# Patient Record
Sex: FEMALE
[De-identification: names, ages, dates, MRNs, and addresses within clinical notes are randomized per-mention and may not be internally consistent; named-entity substitution may affect disease eponyms.]

---

## 2018-10-01 ENCOUNTER — RECORDS - HEALTHEAST (OUTPATIENT)
Dept: ADMINISTRATIVE | Facility: OTHER | Age: 68
End: 2018-10-01

## 2023-06-09 LAB
ALANINE AMINOTRANSFERASE (SGPT) (U/L) IN SER/PLAS: 24 U/L (ref 7–45)
ALBUMIN (G/DL) IN SER/PLAS: 3.9 G/DL (ref 3.4–5)
ALKALINE PHOSPHATASE (U/L) IN SER/PLAS: 80 U/L (ref 33–136)
ANION GAP IN SER/PLAS: 11 MMOL/L (ref 10–20)
ASPARTATE AMINOTRANSFERASE (SGOT) (U/L) IN SER/PLAS: 29 U/L (ref 9–39)
BASOPHILS (10*3/UL) IN BLOOD BY AUTOMATED COUNT: 0.04 X10E9/L (ref 0–0.1)
BASOPHILS/100 LEUKOCYTES IN BLOOD BY AUTOMATED COUNT: 0.7 % (ref 0–2)
BILIRUBIN TOTAL (MG/DL) IN SER/PLAS: 0.7 MG/DL (ref 0–1.2)
C REACTIVE PROTEIN (MG/L) IN SER/PLAS: 0.2 MG/DL
CALCIUM (MG/DL) IN SER/PLAS: 9.7 MG/DL (ref 8.6–10.6)
CARBON DIOXIDE, TOTAL (MMOL/L) IN SER/PLAS: 31 MMOL/L (ref 21–32)
CHLORIDE (MMOL/L) IN SER/PLAS: 107 MMOL/L (ref 98–107)
CREATININE (MG/DL) IN SER/PLAS: 0.68 MG/DL (ref 0.5–1.05)
EOSINOPHILS (10*3/UL) IN BLOOD BY AUTOMATED COUNT: 0.15 X10E9/L (ref 0–0.4)
EOSINOPHILS/100 LEUKOCYTES IN BLOOD BY AUTOMATED COUNT: 2.7 % (ref 0–6)
ERYTHROCYTE DISTRIBUTION WIDTH (RATIO) BY AUTOMATED COUNT: 12.1 % (ref 11.5–14.5)
ERYTHROCYTE MEAN CORPUSCULAR HEMOGLOBIN CONCENTRATION (G/DL) BY AUTOMATED: 32.1 G/DL (ref 32–36)
ERYTHROCYTE MEAN CORPUSCULAR VOLUME (FL) BY AUTOMATED COUNT: 95 FL (ref 80–100)
ERYTHROCYTES (10*6/UL) IN BLOOD BY AUTOMATED COUNT: 4.71 X10E12/L (ref 4–5.2)
GFR FEMALE: >90 ML/MIN/1.73M2
GLUCOSE (MG/DL) IN SER/PLAS: 80 MG/DL (ref 74–99)
HEMATOCRIT (%) IN BLOOD BY AUTOMATED COUNT: 44.6 % (ref 36–46)
HEMOGLOBIN (G/DL) IN BLOOD: 14.3 G/DL (ref 12–16)
IMMATURE GRANULOCYTES/100 LEUKOCYTES IN BLOOD BY AUTOMATED COUNT: 0.4 % (ref 0–0.9)
LEUKOCYTES (10*3/UL) IN BLOOD BY AUTOMATED COUNT: 5.6 X10E9/L (ref 4.4–11.3)
LYMPHOCYTES (10*3/UL) IN BLOOD BY AUTOMATED COUNT: 1.51 X10E9/L (ref 0.8–3)
LYMPHOCYTES/100 LEUKOCYTES IN BLOOD BY AUTOMATED COUNT: 27.2 % (ref 13–44)
MONOCYTES (10*3/UL) IN BLOOD BY AUTOMATED COUNT: 0.44 X10E9/L (ref 0.05–0.8)
MONOCYTES/100 LEUKOCYTES IN BLOOD BY AUTOMATED COUNT: 7.9 % (ref 2–10)
NEUTROPHILS (10*3/UL) IN BLOOD BY AUTOMATED COUNT: 3.4 X10E9/L (ref 1.6–5.5)
NEUTROPHILS/100 LEUKOCYTES IN BLOOD BY AUTOMATED COUNT: 61.1 % (ref 40–80)
NRBC (PER 100 WBCS) BY AUTOMATED COUNT: 0 /100 WBC (ref 0–0)
PLATELETS (10*3/UL) IN BLOOD AUTOMATED COUNT: 257 X10E9/L (ref 150–450)
POTASSIUM (MMOL/L) IN SER/PLAS: 4.4 MMOL/L (ref 3.5–5.3)
PROTEIN TOTAL: 6.6 G/DL (ref 6.4–8.2)
SODIUM (MMOL/L) IN SER/PLAS: 145 MMOL/L (ref 136–145)
THYROTROPIN (MIU/L) IN SER/PLAS BY DETECTION LIMIT <= 0.05 MIU/L: 1.07 MIU/L (ref 0.44–3.98)
UREA NITROGEN (MG/DL) IN SER/PLAS: 24 MG/DL (ref 6–23)

## 2023-06-16 LAB
AB TO VEDOLIZUMAB(ATV) CONC.: <1.6 U/ML
ANSER VDZ:RESULTS: ABNORMAL
VEDOLIZUMAB(VDZ) CONC.: 19.9 UG/ML

## 2023-10-13 PROBLEM — M21.6X9 ACQUIRED PES CAVUS: Status: ACTIVE | Noted: 2023-10-13

## 2023-10-13 PROBLEM — M25.529 ELBOW PAIN: Status: ACTIVE | Noted: 2023-10-13

## 2023-10-13 PROBLEM — N20.0 NEPHROLITHIASIS: Status: ACTIVE | Noted: 2023-10-13

## 2023-10-13 PROBLEM — M79.604 RIGHT LEG PAIN: Status: ACTIVE | Noted: 2023-10-13

## 2023-10-13 PROBLEM — M76.72 PERONEAL TENDONITIS, LEFT: Status: ACTIVE | Noted: 2023-10-13

## 2023-10-13 PROBLEM — R19.5 FECAL OCCULT BLOOD TEST POSITIVE: Status: ACTIVE | Noted: 2023-10-13

## 2023-10-13 PROBLEM — R42 DIZZINESS: Status: ACTIVE | Noted: 2023-10-13

## 2023-10-13 PROBLEM — I25.5 ISCHEMIC CARDIOMYOPATHY: Status: ACTIVE | Noted: 2023-10-13

## 2023-10-13 PROBLEM — D48.5 NEOPLASM OF UNCERTAIN BEHAVIOR OF SKIN: Status: ACTIVE | Noted: 2021-08-25

## 2023-10-13 PROBLEM — K51.30 ULCERATIVE RECTOSIGMOIDITIS WITHOUT COMPLICATION (MULTI): Status: ACTIVE | Noted: 2023-10-13

## 2023-10-13 PROBLEM — M79.675 TOE PAIN, LEFT: Status: ACTIVE | Noted: 2023-10-13

## 2023-10-13 PROBLEM — R35.0 URINARY FREQUENCY: Status: ACTIVE | Noted: 2023-10-13

## 2023-10-13 PROBLEM — J30.0 VASOMOTOR RHINITIS: Status: ACTIVE | Noted: 2023-10-13

## 2023-10-13 PROBLEM — G47.00 INSOMNIA: Status: ACTIVE | Noted: 2023-10-13

## 2023-10-13 PROBLEM — L82.1 OTHER SEBORRHEIC KERATOSIS: Status: ACTIVE | Noted: 2021-08-25

## 2023-10-13 PROBLEM — D23.9 OTHER BENIGN NEOPLASM OF SKIN, UNSPECIFIED: Status: ACTIVE | Noted: 2021-08-25

## 2023-10-13 PROBLEM — J30.1 ALLERGIC REACTION TO INHALED POLLEN: Status: ACTIVE | Noted: 2023-10-13

## 2023-10-13 PROBLEM — H04.129 DRY EYE SYNDROME: Status: ACTIVE | Noted: 2023-10-13

## 2023-10-13 PROBLEM — L81.4 OTHER MELANIN HYPERPIGMENTATION: Status: ACTIVE | Noted: 2021-08-25

## 2023-10-13 PROBLEM — R06.02 SHORTNESS OF BREATH ON EXERTION: Status: ACTIVE | Noted: 2023-10-13

## 2023-10-13 PROBLEM — M75.50 SUBACROMIAL BURSITIS: Status: ACTIVE | Noted: 2023-10-13

## 2023-10-13 PROBLEM — I10 BENIGN ESSENTIAL HYPERTENSION: Status: ACTIVE | Noted: 2023-10-13

## 2023-10-13 PROBLEM — I21.3 STEMI (ST ELEVATION MYOCARDIAL INFARCTION) (MULTI): Status: ACTIVE | Noted: 2023-10-13
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
## 2023-10-13 PROBLEM — A04.8 H. PYLORI INFECTION: Status: ACTIVE | Noted: 2023-10-13

## 2023-10-13 PROBLEM — R03.0 BLOOD PRESSURE ELEVATED WITHOUT HISTORY OF HTN: Status: ACTIVE | Noted: 2023-10-13

## 2023-10-13 PROBLEM — N30.90 CYSTITIS: Status: ACTIVE | Noted: 2023-10-13

## 2023-10-13 PROBLEM — R68.81 EARLY SATIETY: Status: ACTIVE | Noted: 2023-10-13

## 2023-10-13 PROBLEM — E78.49 ESSENTIAL FAMILIAL HYPERLIPIDEMIA: Status: ACTIVE | Noted: 2023-10-13

## 2023-10-13 PROBLEM — I25.10 ASCVD (ARTERIOSCLEROTIC CARDIOVASCULAR DISEASE): Status: ACTIVE | Noted: 2023-10-13

## 2023-10-13 PROBLEM — N20.1 RIGHT URETERAL STONE: Status: ACTIVE | Noted: 2023-10-13

## 2023-10-13 PROBLEM — D22.39 MELANOCYTIC NEVI OF OTHER PARTS OF FACE: Status: ACTIVE | Noted: 2021-08-25

## 2023-10-13 PROBLEM — M25.519 SHOULDER PAIN: Status: ACTIVE | Noted: 2023-10-13

## 2023-10-13 PROBLEM — S69.91XA THUMB INJURY, RIGHT, INITIAL ENCOUNTER: Status: ACTIVE | Noted: 2023-10-13

## 2023-10-13 PROBLEM — R14.0 ABDOMINAL BLOATING: Status: ACTIVE | Noted: 2023-10-13

## 2023-10-13 PROBLEM — R23.2 FLUSHING: Status: ACTIVE | Noted: 2023-10-13

## 2023-10-13 PROBLEM — N13.30 HYDRONEPHROSIS, LEFT: Status: ACTIVE | Noted: 2023-10-13

## 2023-10-13 RX ORDER — ATORVASTATIN CALCIUM 80 MG/1
1 TABLET, FILM COATED ORAL NIGHTLY
COMMUNITY
Start: 2021-10-28 | End: 2023-12-14 | Stop reason: SDUPTHER

## 2023-10-13 RX ORDER — ACETAMINOPHEN 500 MG
TABLET ORAL
COMMUNITY

## 2023-10-13 RX ORDER — ASPIRIN 81 MG/1
1 TABLET ORAL DAILY
COMMUNITY
Start: 2021-10-28

## 2023-10-13 RX ORDER — LOSARTAN POTASSIUM 25 MG/1
1 TABLET ORAL DAILY
COMMUNITY
Start: 2021-10-29 | End: 2023-12-14 | Stop reason: SDUPTHER

## 2023-10-13 RX ORDER — CYCLOSPORINE 0.5 MG/ML
1 EMULSION OPHTHALMIC 2 TIMES DAILY
COMMUNITY

## 2023-10-13 RX ORDER — PANTOPRAZOLE SODIUM 40 MG/1
1 TABLET, DELAYED RELEASE ORAL DAILY
COMMUNITY
Start: 2021-11-10 | End: 2023-10-26 | Stop reason: ALTCHOICE

## 2023-10-13 RX ORDER — MESALAMINE 1.2 G/1
4 TABLET, DELAYED RELEASE ORAL DAILY
COMMUNITY
Start: 2020-11-25 | End: 2023-12-12 | Stop reason: SDUPTHER

## 2023-10-13 RX ORDER — METOPROLOL TARTRATE 25 MG/1
0.5 TABLET, FILM COATED ORAL 2 TIMES DAILY
COMMUNITY
Start: 2021-10-28 | End: 2024-03-20 | Stop reason: SDUPTHER

## 2023-10-13 RX ORDER — GINSENG 100 MG
CAPSULE ORAL
COMMUNITY
Start: 2021-06-24 | End: 2023-10-26

## 2023-10-16 ENCOUNTER — OFFICE VISIT (OUTPATIENT)
Dept: DERMATOLOGY | Facility: HOSPITAL | Age: 73
End: 2023-10-16
Payer: COMMERCIAL

## 2023-10-16 DIAGNOSIS — D18.01 HEMANGIOMA OF SKIN: ICD-10-CM

## 2023-10-16 DIAGNOSIS — D22.9 MULTIPLE BENIGN NEVI: ICD-10-CM

## 2023-10-16 DIAGNOSIS — L82.1 SEBORRHEIC KERATOSIS: ICD-10-CM

## 2023-10-16 DIAGNOSIS — B37.83 ANGULAR CHEILITIS WITH CANDIDIASIS: Primary | ICD-10-CM

## 2023-10-16 DIAGNOSIS — Z12.83 SCREENING EXAM FOR SKIN CANCER: ICD-10-CM

## 2023-10-16 DIAGNOSIS — L81.4 LENTIGO: ICD-10-CM

## 2023-10-16 PROBLEM — D22.39 MELANOCYTIC NEVI OF OTHER PARTS OF FACE: Status: RESOLVED | Noted: 2021-08-25 | Resolved: 2023-10-16

## 2023-10-16 PROBLEM — D23.9 OTHER BENIGN NEOPLASM OF SKIN, UNSPECIFIED: Status: RESOLVED | Noted: 2021-08-25 | Resolved: 2023-10-16

## 2023-10-16 PROCEDURE — 1159F MED LIST DOCD IN RCRD: CPT | Performed by: DERMATOLOGY

## 2023-10-16 PROCEDURE — 99214 OFFICE O/P EST MOD 30 MIN: CPT | Performed by: DERMATOLOGY

## 2023-10-16 PROCEDURE — 1125F AMNT PAIN NOTED PAIN PRSNT: CPT | Performed by: DERMATOLOGY

## 2023-10-16 PROCEDURE — 1160F RVW MEDS BY RX/DR IN RCRD: CPT | Performed by: DERMATOLOGY

## 2023-10-16 RX ORDER — KETOCONAZOLE 20 MG/G
CREAM TOPICAL 2 TIMES DAILY
Qty: 30 G | Refills: 11 | Status: SHIPPED | OUTPATIENT
Start: 2023-10-16

## 2023-10-16 ASSESSMENT — ITCH NUMERIC RATING SCALE: HOW SEVERE IS YOUR ITCHING?: 0

## 2023-10-16 NOTE — PROGRESS NOTES
"Subjective     Anni Mayo is a 73 y.o. female who presents for the following: Skin Check (Patient states she has \"cracking\" in corner of mouth. Used nystatin cream and fluocinolone ointment. Started over 6mos ago. ).     History of ulcerative colitis in remission.    Review of Systems:  No other skin or systemic complaints other than what is documented elsewhere in the note.    The following portions of the chart were reviewed this encounter and updated as appropriate:  Allergies  Meds  Problems  Med Hx  Surg Hx         Skin Cancer History  No skin cancer on file.      Specialty Problems          Dermatology Problems    Neoplasm of uncertain behavior of skin        Objective   Well appearing patient in no apparent distress; mood and affect are within normal limits.    A full examination was performed including scalp, head, eyes, ears, nose, lips, neck, chest, axillae, abdomen, back, buttocks, bilateral upper extremities, bilateral lower extremities, hands, feet, fingers, toes, fingernails, and toenails. All findings within normal limits unless otherwise noted below.    Assessment/Plan   1. Angular cheilitis with candidiasis  Lips  Light pink erythema of angle of mouth bilaterally with extension slightly down marionette line    - ongoing about 1 year  - tried vaseline  - PCP rec'ed topical ABX with worsening  - saw outside derm Rx nystatin x10 days and fluocinolone with improvement but quick recurrence    - discussed relation to saliva irritation and yeast  - use ketoconazole cream x2-4 weeks for flares and then vaseline nightly for prevention    - do not think it is c/w her ulcerative colitis, but could consider that if not improving    ketoconazole (NIZOral) 2 % cream - Lips  Apply topically 2 times a day. Angles of mouth x2-4 weeks for flares    2. Multiple benign nevi  Scattered, uniform and benign-appearing, regular brown melanocytic papules and macules.    - Discussed benign nature and that no " treatment is necessary unless it becomes painful or increases in size. Patient opts for clinical monitoring at this time.     3. Lentigo  Scattered tan macules in sun-exposed areas.    - Discussed benign nature and that no treatment is necessary unless it becomes painful or increases in size. Patient opts for clinical monitoring at this time.     4. Seborrheic keratosis  Stuck on verrucous, tan-brown papules and plaques.      - Discussed benign nature and that no treatment is necessary unless it becomes painful or increases in size. Patient opts for clinical monitoring at this time.     5. Hemangioma of skin  Scattered cherry-red papule(s).    - Discussed benign nature and that no treatment is necessary unless it becomes painful or increases in size. Patient opts for clinical monitoring at this time.     6. Screening exam for skin cancer    Full body skin exam  -No lesions concerning for malignancy on the remainder the skin exam today   - The ugly duckling sign was discussed. Monitor for any skin lesions that are different in color, shape, or size than others on body  -Sun protection was discussed. Recommend SPF 30+, hats with brims, sun protective clothing, and avoiding sun exposure between 10 AM and 2 PM whenever possible  -Recommend regular skin exams or sooner if new or changing lesions       Related Procedures  Follow Up In Dermatology - Established Patient        1 year FSE   Refills OK

## 2023-10-26 ENCOUNTER — OFFICE VISIT (OUTPATIENT)
Dept: CARDIOLOGY | Facility: HOSPITAL | Age: 73
End: 2023-10-26
Payer: COMMERCIAL

## 2023-10-26 VITALS
WEIGHT: 147 LBS | OXYGEN SATURATION: 98 % | HEIGHT: 66 IN | SYSTOLIC BLOOD PRESSURE: 139 MMHG | HEART RATE: 62 BPM | DIASTOLIC BLOOD PRESSURE: 72 MMHG | BODY MASS INDEX: 23.63 KG/M2

## 2023-10-26 DIAGNOSIS — I25.10 CORONARY ARTERY DISEASE INVOLVING NATIVE CORONARY ARTERY OF NATIVE HEART WITHOUT ANGINA PECTORIS: Primary | ICD-10-CM

## 2023-10-26 PROCEDURE — 3078F DIAST BP <80 MM HG: CPT | Performed by: HOSPITALIST

## 2023-10-26 PROCEDURE — 99214 OFFICE O/P EST MOD 30 MIN: CPT | Performed by: HOSPITALIST

## 2023-10-26 PROCEDURE — 3075F SYST BP GE 130 - 139MM HG: CPT | Performed by: HOSPITALIST

## 2023-10-26 PROCEDURE — 1125F AMNT PAIN NOTED PAIN PRSNT: CPT | Performed by: HOSPITALIST

## 2023-10-26 PROCEDURE — 1159F MED LIST DOCD IN RCRD: CPT | Performed by: HOSPITALIST

## 2023-10-26 PROCEDURE — 1036F TOBACCO NON-USER: CPT | Performed by: HOSPITALIST

## 2023-10-26 PROCEDURE — 1160F RVW MEDS BY RX/DR IN RCRD: CPT | Performed by: HOSPITALIST

## 2023-10-26 NOTE — PROGRESS NOTES
Subjective   Anni Mayo is a 73 y.o. female with past medical history of ulcerative colitis, kidney stones, and anterior STEMI status post IVUS-guided PCI to proximal LAD using 4.0 x 32 mm WAQAS [post dilated with 5.0 NC] with excellent results on 10/10/2021 by Dr. Roberson [LVEF 35% with akinesis in the LAD territory at time of presentation, recovered later]. Patient is here for routine follow-up.  Patient is a retired /teacher.  Patient is very physically active for her age, she does strength training twice a week, she also does hiking and riding bike 2-3 times a week.  Patient denies any chest pain, palpitation, orthopnea, or PND.  Patient reports occasional dyspnea on exertion if she was to climb a steep hill and after a 10 to 15 minutes of climbing, she mentions that her  has to take a break before she does.  She also reports feeling slightly lightheaded after a hot shower with lower systolic BP readings.  Patient's blood pressure at home is 110-120/70-80. Patient is compliant with her medications including aspirin,, denies any bleeding or black stool. Patient recent lab with triglyceride of 81, LDL of 71, HDL of 63 on 5/2022. Patient quit smoking 5 years ago.    EKG today in the clinic on 7/5/2022, reviewed by myself, showed sinus rhythm with PACs, normal EKG.    TTE in 11/18/2021 with LVEF of 55-60% from 35% on 10/11, apex and distal septum are hypokinetic [apex was akinetic before, LAD territory was hypokinetic].    Review of Systems  ROS is negative other than in HPI.      Objective   Physical Exam  General: NAD  HEENT: IEOM, PERRL   Neck: No JVD or carotid bruit  Lungs: CTAB  Heart: RRR, normal S1 and S2, no loud murmurs  Abdomen: Soft, nontender, positive bowel sounds  Extremities: No edema  Neurologic: No FND  Psychiatric: Normal mood and affect    Assessment/Plan   1-STEMI:  -Pt had an anterior STEMI treated with IVUS-guided PCI to the LAD using 4.0 x 32 mm WAQAS (post dilated  with 5.0 NC) with excellent results on 10/10/2021.  -LVEF 35% with akinesis in the LAD territory, and had recovered to 55-60% most recent echocardiogram in 11/20/2021, only distal septum and apex are hypokinetic.  -Patient is doing well overall, very active with no cardiovascular symptoms, only occasional VELASQUEZ after 10 to 15 minutes of climbing steep hill.  -Continue aspirin 81 mg daily for life for, metoprolol 12.5 mg once daily, and losartan 25 mg once daily.  -Aggressive healthy lifestyle modifications and risk factors control. Patient not a smoker. Lipids numbers are much improved.  -We will check lipid panel with next visit.    2-ICM:  -Resolved as above.  -No signs or symptoms of heart failure.  -Continue beta-blocker and losartan as above.    3-other medical problems are chronic and stable, further management as per PCP.    Return to clinic in 1 year.         Lizette Escalera MD

## 2023-10-26 NOTE — PATIENT INSTRUCTIONS
Thank you so much for visiting us today.    I am glad you are doing great, continue all medication including aspirin 81 mg once daily for life.    We will see back in 1 year, please call us at [phone] meanwhile if you have any questions.

## 2023-11-13 ENCOUNTER — OFFICE VISIT (OUTPATIENT)
Dept: ORTHOPEDIC SURGERY | Facility: HOSPITAL | Age: 73
End: 2023-11-13
Payer: COMMERCIAL

## 2023-11-13 DIAGNOSIS — M67.874 ACHILLES TENDINOSIS OF LEFT ANKLE: Primary | ICD-10-CM

## 2023-11-13 PROCEDURE — 1036F TOBACCO NON-USER: CPT | Performed by: ORTHOPAEDIC SURGERY

## 2023-11-13 PROCEDURE — 1125F AMNT PAIN NOTED PAIN PRSNT: CPT | Performed by: ORTHOPAEDIC SURGERY

## 2023-11-13 PROCEDURE — 1160F RVW MEDS BY RX/DR IN RCRD: CPT | Performed by: ORTHOPAEDIC SURGERY

## 2023-11-13 PROCEDURE — 3078F DIAST BP <80 MM HG: CPT | Performed by: ORTHOPAEDIC SURGERY

## 2023-11-13 PROCEDURE — 1159F MED LIST DOCD IN RCRD: CPT | Performed by: ORTHOPAEDIC SURGERY

## 2023-11-13 PROCEDURE — 3075F SYST BP GE 130 - 139MM HG: CPT | Performed by: ORTHOPAEDIC SURGERY

## 2023-11-13 PROCEDURE — 99212 OFFICE O/P EST SF 10 MIN: CPT | Performed by: ORTHOPAEDIC SURGERY

## 2023-11-13 NOTE — PROGRESS NOTES
This is a pleasant 73 y.o. year old female who presents for fuv of the left ankle and foot .  She finished PT and is doing her HEP.  She states that no pain, getting back to her usual activities.  Wearing flats today.  No pain, swelling down.      PHYSICAL EXAMINATION  Constitutional Exam: patient's height and weight reviewed, well-kempt  Psychiatric Exam: alert and oriented x 3, appropriate mood and behavior  Eye Exam: MANA, EOMI  Pulmonary Exam: breathing non-labored, no apparent distress  Lymphatic exam: no appreciable lymphadenopathy in the lower extremities  Cardiovascular exam: DP pulses 2+ bilaterally, PT 2+ bilaterally, toes are pink with good capillary refill, no pitting edema  Skin exam: no open lesions, rashes, abrasions or ulcerations  Neurological exam: sensation to light touch intact in both lower extremities in peripheral and dermatomal distributions (except for any abnormalities noted in musculoskeletal exam)    Musculoskeletal exam: left ankle and foot: mild nodule in achilles tendon proximally due to tendinopathy but no pain, full ROM and strength of foot and ankle.     ASSESSMENT: Resolved flare-up of left proximal achilles tendinitis on top of chronic tendinopathy, mild pes cavus, hx of IBS on Entyvia  PLAN: She will continue with her HEP for maintenance.  Discussed using shoes with cushioning and arch support to offload the tendon.  Discussed causes of tendinopathy.  Reviewed exercise program recommendations for crosstraining.  FUV prn.  The patient's questions were answered in detail.      Note dictated with [employer] software, completed without full type editing to avoid delay.

## 2023-12-12 DIAGNOSIS — K51.30 CHRONIC ULCERATIVE RECTOSIGMOIDITIS WITHOUT COMPLICATIONS (MULTI): ICD-10-CM

## 2023-12-12 RX ORDER — MESALAMINE 1.2 G/1
4.8 TABLET, DELAYED RELEASE ORAL DAILY
Qty: 360 TABLET | Refills: 1 | Status: SHIPPED | OUTPATIENT
Start: 2023-12-12

## 2023-12-14 DIAGNOSIS — I21.3 ST ELEVATION MYOCARDIAL INFARCTION (STEMI), UNSPECIFIED ARTERY (MULTI): Primary | ICD-10-CM

## 2023-12-14 DIAGNOSIS — I25.5 ISCHEMIC CARDIOMYOPATHY: ICD-10-CM

## 2023-12-14 RX ORDER — LOSARTAN POTASSIUM 25 MG/1
25 TABLET ORAL DAILY
Qty: 90 TABLET | Refills: 3 | Status: SHIPPED | OUTPATIENT
Start: 2023-12-14 | End: 2024-01-31 | Stop reason: SDUPTHER

## 2023-12-14 RX ORDER — ATORVASTATIN CALCIUM 80 MG/1
80 TABLET, FILM COATED ORAL NIGHTLY
Qty: 90 TABLET | Refills: 3 | Status: SHIPPED | OUTPATIENT
Start: 2023-12-14 | End: 2024-01-31 | Stop reason: SDUPTHER

## 2023-12-14 NOTE — TELEPHONE ENCOUNTER
LOV note reviewed Oct 2023. Scripts cued for mailorder pharmacy and forwarded to Dr. Escalera to send

## 2024-01-22 ENCOUNTER — OFFICE VISIT (OUTPATIENT)
Dept: OBSTETRICS AND GYNECOLOGY | Facility: CLINIC | Age: 74
End: 2024-01-22
Payer: COMMERCIAL

## 2024-01-22 VITALS
BODY MASS INDEX: 23.14 KG/M2 | DIASTOLIC BLOOD PRESSURE: 83 MMHG | SYSTOLIC BLOOD PRESSURE: 148 MMHG | HEIGHT: 66 IN | WEIGHT: 144 LBS

## 2024-01-22 DIAGNOSIS — N95.2 ATROPHIC VAGINITIS: Primary | ICD-10-CM

## 2024-01-22 DIAGNOSIS — Z01.419 ENCOUNTER FOR GYNECOLOGICAL EXAMINATION: ICD-10-CM

## 2024-01-22 DIAGNOSIS — Z11.3 SCREEN FOR STD (SEXUALLY TRANSMITTED DISEASE): ICD-10-CM

## 2024-01-22 PROCEDURE — 1126F AMNT PAIN NOTED NONE PRSNT: CPT | Performed by: OBSTETRICS & GYNECOLOGY

## 2024-01-22 PROCEDURE — 3077F SYST BP >= 140 MM HG: CPT | Performed by: OBSTETRICS & GYNECOLOGY

## 2024-01-22 PROCEDURE — 88142 CYTOPATH C/V THIN LAYER: CPT

## 2024-01-22 PROCEDURE — 1159F MED LIST DOCD IN RCRD: CPT | Performed by: OBSTETRICS & GYNECOLOGY

## 2024-01-22 PROCEDURE — 88141 CYTOPATH C/V INTERPRET: CPT | Performed by: STUDENT IN AN ORGANIZED HEALTH CARE EDUCATION/TRAINING PROGRAM

## 2024-01-22 PROCEDURE — 87070 CULTURE OTHR SPECIMN AEROBIC: CPT

## 2024-01-22 PROCEDURE — 87624 HPV HI-RISK TYP POOLED RSLT: CPT

## 2024-01-22 PROCEDURE — 87800 DETECT AGNT MULT DNA DIREC: CPT

## 2024-01-22 PROCEDURE — 87077 CULTURE AEROBIC IDENTIFY: CPT

## 2024-01-22 PROCEDURE — 99204 OFFICE O/P NEW MOD 45 MIN: CPT | Performed by: OBSTETRICS & GYNECOLOGY

## 2024-01-22 PROCEDURE — 3079F DIAST BP 80-89 MM HG: CPT | Performed by: OBSTETRICS & GYNECOLOGY

## 2024-01-22 PROCEDURE — 1036F TOBACCO NON-USER: CPT | Performed by: OBSTETRICS & GYNECOLOGY

## 2024-01-22 RX ORDER — DICYCLOMINE HYDROCHLORIDE 10 MG/1
CAPSULE ORAL
COMMUNITY
Start: 2023-05-31

## 2024-01-22 RX ORDER — ESTRADIOL 0.1 MG/G
1 CREAM VAGINAL EVERY OTHER DAY
Qty: 45 G | Refills: 3 | Status: SHIPPED | OUTPATIENT
Start: 2024-01-22 | End: 2025-01-21

## 2024-01-22 ASSESSMENT — PAIN SCALES - GENERAL: PAINLEVEL: 0-NO PAIN

## 2024-01-22 ASSESSMENT — ENCOUNTER SYMPTOMS
BACK PAIN: 0
CHILLS: 0
DIARRHEA: 0
HEMATURIA: 0
ABDOMINAL PAIN: 0
HEADACHES: 0
VOMITING: 0
FEVER: 0
ANOREXIA: 0
DYSURIA: 0
FLANK PAIN: 0
NAUSEA: 0
SORE THROAT: 1
FREQUENCY: 0
CONSTIPATION: 0

## 2024-01-23 LAB
C TRACH RRNA SPEC QL NAA+PROBE: NEGATIVE
N GONORRHOEA DNA SPEC QL PROBE+SIG AMP: NEGATIVE

## 2024-01-25 ENCOUNTER — TELEPHONE (OUTPATIENT)
Dept: OBSTETRICS AND GYNECOLOGY | Facility: CLINIC | Age: 74
End: 2024-01-25
Payer: COMMERCIAL

## 2024-01-26 LAB — BACTERIA GENITAL AEROBE CULT: ABNORMAL

## 2024-01-28 ASSESSMENT — ENCOUNTER SYMPTOMS
NEUROLOGICAL NEGATIVE: 1
ENDOCRINE NEGATIVE: 1
PSYCHIATRIC NEGATIVE: 1
ANOREXIA: 0
MUSCULOSKELETAL NEGATIVE: 1
CARDIOVASCULAR NEGATIVE: 1
EYES NEGATIVE: 1
RESPIRATORY NEGATIVE: 1
HEMATOLOGIC/LYMPHATIC NEGATIVE: 1
ALLERGIC/IMMUNOLOGIC NEGATIVE: 1
GASTROINTESTINAL NEGATIVE: 1
CONSTITUTIONAL NEGATIVE: 1

## 2024-01-28 NOTE — PROGRESS NOTES
"Subjective   Patient ID: Flaca Mayo \"Anni\" is a 73 y.o. female who presents for New Patient Visit (New patient is here re: vaginal discharge./Last pap:  per pt 7 yrs ago  (norm)/Last megan:  2022  cat 1/Last colon screen:  10/2023/Marlyn sheridan.   Shakira Smith LPN).  Female  Problem  The patient's pertinent negatives include no genital itching, genital lesions, genital odor, genital rash, missed menses or vaginal bleeding. This is a new problem. The current episode started in the past 7 days. The problem occurs constantly. The problem has been unchanged. The patient is experiencing no pain. She is not pregnant. Pertinent negatives include no anorexia, discolored urine, joint pain, joint swelling or painful intercourse. The vaginal discharge was copious, green, malodorous, watery and yellow. She has not been passing clots. Nothing aggravates the symptoms. She is sexually active. No, her partner does not have an STD. She uses nothing for contraception. She is postmenopausal.    patient is here to establish care she complains of yellowish vaginal discharge patient is 73-year-old female  4 para 3 menopause early 50s no bleeding since patient noticed clear then yellow-greenish discharge some irritation but no itching last Pap test 7 years ago normal last mammogram 1 year ago normal patient does not smoke she drinks alcohol most of the days she does not use drugs patient takes number of medication which includes Restasis ASA atorvastatin losartan Misofen against ulcerative colitis Metroprolol and TAVR patient has no allergies past medical history significant for hypertension coronary heart disease I believe ulcerative colitis past surgical history ovarian cyst  and cataract surgery family history positive for hypertension and diabetes    Review of Systems   Constitutional: Negative.    Eyes: Negative.    Respiratory: Negative.     Cardiovascular: Negative.    Gastrointestinal: " Negative.  Negative for anorexia.   Endocrine: Negative.    Genitourinary: Negative.  Negative for missed menses.   Musculoskeletal: Negative.  Negative for joint pain.   Skin: Negative.    Allergic/Immunologic: Negative.    Neurological: Negative.    Hematological: Negative.    Psychiatric/Behavioral: Negative.         Objective   Physical Exam  Constitutional:       Appearance: Normal appearance.   HENT:      Head: Normocephalic and atraumatic.   Cardiovascular:      Rate and Rhythm: Normal rate and regular rhythm.      Pulses: Normal pulses.      Heart sounds: Normal heart sounds.   Pulmonary:      Effort: Pulmonary effort is normal.      Breath sounds: Normal breath sounds.   Abdominal:      General: Abdomen is flat. Bowel sounds are normal.      Palpations: Abdomen is soft.      Hernia: There is no hernia in the left inguinal area or right inguinal area.   Genitourinary:     General: Normal vulva.      Exam position: Lithotomy position.      Labia:         Right: No rash, tenderness or lesion.         Left: No rash, tenderness or lesion.       Vagina: Vaginal discharge present.      Cervix: Normal.      Uterus: Normal.       Adnexa: Right adnexa normal and left adnexa normal.      Comments: Vagina atrophic  Musculoskeletal:      Cervical back: Normal range of motion and neck supple.   Skin:     General: Skin is warm and dry.   Neurological:      General: No focal deficit present.      Mental Status: She is alert and oriented to person, place, and time.         Assessment/Plan    vaginal atrophy atrophic vaginitis  Start estradiol vaginal cream 1 g into vagina every other day         Ruben Garcia MD 01/28/24 4:28 PM

## 2024-01-31 DIAGNOSIS — I21.3 ST ELEVATION MYOCARDIAL INFARCTION (STEMI), UNSPECIFIED ARTERY (MULTI): ICD-10-CM

## 2024-01-31 DIAGNOSIS — I25.5 ISCHEMIC CARDIOMYOPATHY: ICD-10-CM

## 2024-01-31 RX ORDER — LOSARTAN POTASSIUM 25 MG/1
25 TABLET ORAL DAILY
Qty: 90 TABLET | Refills: 3 | Status: SHIPPED | OUTPATIENT
Start: 2024-01-31 | End: 2025-01-30

## 2024-01-31 RX ORDER — ATORVASTATIN CALCIUM 80 MG/1
80 TABLET, FILM COATED ORAL NIGHTLY
Qty: 90 TABLET | Refills: 3 | Status: SHIPPED | OUTPATIENT
Start: 2024-01-31 | End: 2025-01-30

## 2024-01-31 NOTE — TELEPHONE ENCOUNTER
Received [facility] patient requesting refills to new mail order pharmacy. Compliant with appointments. Script cued and forwarded to Dr. Escalera to send

## 2024-02-05 ENCOUNTER — DOCUMENTATION (OUTPATIENT)
Dept: PHYSICAL THERAPY | Facility: CLINIC | Age: 74
End: 2024-02-05
Payer: COMMERCIAL

## 2024-02-05 NOTE — PROGRESS NOTES
"Physical Therapy    Discharge Summary    Name: Flaca Mayo \"Anni\"  MRN: [id number]  : 1950  Date: 24    Discharge Summary: PT    Discharge Information: Date of discharge 24, Date of last visit 23, Date of evaluation 23, Number of attended visits 5, Referred by Kobi Butts MD, and Referred for achilles tendonitis    Therapy Summary: Plan of care consisted of improving strength and ROM to decrease pain when performing ADLs    Discharge Status: Discharge from physical therapy      Rehab Discharge Reason: Achieved all and/or the most significant goals(s)  "

## 2024-02-09 LAB
CYTOLOGY CMNT CVX/VAG CYTO-IMP: NORMAL
HPV HR 12 DNA GENITAL QL NAA+PROBE: NEGATIVE
HPV HR GENOTYPES PNL CVX NAA+PROBE: NEGATIVE
HPV16 DNA SPEC QL NAA+PROBE: NEGATIVE
HPV18 DNA SPEC QL NAA+PROBE: NEGATIVE
LAB AP HPV GENOTYPE QUESTION: YES
LAB AP HPV HR: NORMAL
LABORATORY COMMENT REPORT: NORMAL
LABORATORY COMMENT REPORT: NORMAL
MENSTRUAL HX REPORTED: NORMAL
PATH REPORT.TOTAL CANCER: NORMAL

## 2024-03-20 DIAGNOSIS — I25.10 CORONARY ARTERY DISEASE INVOLVING NATIVE CORONARY ARTERY OF NATIVE HEART WITHOUT ANGINA PECTORIS: Primary | ICD-10-CM

## 2024-03-20 RX ORDER — METOPROLOL TARTRATE 25 MG/1
12.5 TABLET, FILM COATED ORAL 2 TIMES DAILY
Qty: 90 TABLET | Refills: 3 | Status: SHIPPED | OUTPATIENT
Start: 2024-03-20 | End: 2025-03-20

## 2024-03-20 NOTE — TELEPHONE ENCOUNTER
LOV 10/23 note reviewed. Patient for annual fuv. Script cued and forwarded to Dr. Escalera to send

## 2024-07-01 ENCOUNTER — LAB (OUTPATIENT)
Dept: LAB | Facility: LAB | Age: 74
End: 2024-07-01
Payer: COMMERCIAL

## 2024-07-01 ENCOUNTER — APPOINTMENT (OUTPATIENT)
Dept: GASTROENTEROLOGY | Facility: CLINIC | Age: 74
End: 2024-07-01
Payer: COMMERCIAL

## 2024-07-01 VITALS — HEART RATE: 62 BPM | WEIGHT: 143 LBS | OXYGEN SATURATION: 96 % | HEIGHT: 66 IN | BODY MASS INDEX: 22.98 KG/M2

## 2024-07-01 DIAGNOSIS — K51.30 ULCERATIVE RECTOSIGMOIDITIS WITHOUT COMPLICATION (MULTI): ICD-10-CM

## 2024-07-01 DIAGNOSIS — K51.30 ULCERATIVE RECTOSIGMOIDITIS WITHOUT COMPLICATION (MULTI): Primary | ICD-10-CM

## 2024-07-01 PROBLEM — A04.8 H. PYLORI INFECTION: Status: RESOLVED | Noted: 2023-10-13 | Resolved: 2024-07-01

## 2024-07-01 LAB
ALBUMIN SERPL BCP-MCNC: 3.9 G/DL (ref 3.4–5)
ALP SERPL-CCNC: 77 U/L (ref 33–136)
ALT SERPL W P-5'-P-CCNC: 29 U/L (ref 7–45)
ANION GAP SERPL CALC-SCNC: 9 MMOL/L (ref 10–20)
AST SERPL W P-5'-P-CCNC: 34 U/L (ref 9–39)
BILIRUB SERPL-MCNC: 0.5 MG/DL (ref 0–1.2)
BUN SERPL-MCNC: 27 MG/DL (ref 6–23)
CALCIUM SERPL-MCNC: 9.3 MG/DL (ref 8.6–10.6)
CHLORIDE SERPL-SCNC: 108 MMOL/L (ref 98–107)
CO2 SERPL-SCNC: 31 MMOL/L (ref 21–32)
CREAT SERPL-MCNC: 0.7 MG/DL (ref 0.5–1.05)
CRP SERPL-MCNC: 0.21 MG/DL
EGFRCR SERPLBLD CKD-EPI 2021: >90 ML/MIN/1.73M*2
ERYTHROCYTE [DISTWIDTH] IN BLOOD BY AUTOMATED COUNT: 12.5 % (ref 11.5–14.5)
GLUCOSE SERPL-MCNC: 89 MG/DL (ref 74–99)
HCT VFR BLD AUTO: 43.8 % (ref 36–46)
HGB BLD-MCNC: 14.1 G/DL (ref 12–16)
MCH RBC QN AUTO: 31.2 PG (ref 26–34)
MCHC RBC AUTO-ENTMCNC: 32.2 G/DL (ref 32–36)
MCV RBC AUTO: 97 FL (ref 80–100)
NRBC BLD-RTO: 0 /100 WBCS (ref 0–0)
PLATELET # BLD AUTO: 249 X10*3/UL (ref 150–450)
POTASSIUM SERPL-SCNC: 4.3 MMOL/L (ref 3.5–5.3)
PROT SERPL-MCNC: 6.5 G/DL (ref 6.4–8.2)
RBC # BLD AUTO: 4.52 X10*6/UL (ref 4–5.2)
SODIUM SERPL-SCNC: 144 MMOL/L (ref 136–145)
TSH SERPL-ACNC: 0.85 MIU/L (ref 0.44–3.98)
WBC # BLD AUTO: 6.7 X10*3/UL (ref 4.4–11.3)

## 2024-07-01 PROCEDURE — 36415 COLL VENOUS BLD VENIPUNCTURE: CPT

## 2024-07-01 PROCEDURE — 80053 COMPREHEN METABOLIC PANEL: CPT

## 2024-07-01 PROCEDURE — 84443 ASSAY THYROID STIM HORMONE: CPT

## 2024-07-01 PROCEDURE — 1036F TOBACCO NON-USER: CPT | Performed by: INTERNAL MEDICINE

## 2024-07-01 PROCEDURE — 99213 OFFICE O/P EST LOW 20 MIN: CPT | Performed by: INTERNAL MEDICINE

## 2024-07-01 PROCEDURE — 1159F MED LIST DOCD IN RCRD: CPT | Performed by: INTERNAL MEDICINE

## 2024-07-01 PROCEDURE — 85027 COMPLETE CBC AUTOMATED: CPT

## 2024-07-01 PROCEDURE — 1160F RVW MEDS BY RX/DR IN RCRD: CPT | Performed by: INTERNAL MEDICINE

## 2024-07-01 PROCEDURE — 86140 C-REACTIVE PROTEIN: CPT

## 2024-07-01 NOTE — PROGRESS NOTES
"Subjective     History of Present Illness:   Flaca Mayo \"Barbara" is a 73 y.o. female who presents to GI clinic for ulcerative colitis follow up.    No significant joint problems. No AM stiffness, swelling.    No skin rashes    No oral ulcers.    BM normal 1-2 times daily. No blood.    Review of Systems  Review of Systems    Social History   reports that she has never smoked. She has never used smokeless tobacco. She reports current alcohol use of about 6.0 standard drinks of alcohol per week. She reports that she does not currently use drugs.     Allergies  No Known Allergies    Medications  Current Outpatient Medications   Medication Instructions    aspirin 81 mg EC tablet 1 tablet, oral, Daily    atorvastatin (LIPITOR) 80 mg, oral, Nightly    BACILLUS COAGULANS-INULIN ORAL oral    cholecalciferol (Vitamin D-3) 50 mcg (2,000 unit) capsule Vitamin D3 50 MCG (2000 UT) Oral Capsule   Refills: 0       Active    cycloSPORINE (Restasis) 0.05 % ophthalmic emulsion 1 drop, ophthalmic (eye), 2 times daily    dicyclomine (Bentyl) 10 mg capsule TAKE 1 CAPSULE 3 TIMES DAILY AS NEEDED FOR ABD SPASM    docosahexaenoic acid/epa (FISH OIL ORAL) 1 capsule, oral, Daily    estradiol (ESTRACE) 1 g, vaginal, Every other day    ketoconazole (NIZOral) 2 % cream Topical, 2 times daily, Angles of mouth x2-4 weeks for flares    losartan (COZAAR) 25 mg, oral, Daily    mesalamine (LIALDA) 4.8 g, oral, Daily    metoprolol tartrate (LOPRESSOR) 12.5 mg, oral, 2 times daily    vedolizumab (ENTYVIO) 300 mg, intravenous, every 6 weeks.<BR>        Objective   Visit Vitals  Pulse 62      Physical Exam  Constitutional:       Appearance: She is normal weight.   HENT:      Head: Normocephalic and atraumatic.   Eyes:      Pupils: Pupils are equal, round, and reactive to light.   Neurological:      Mental Status: She is alert.   Psychiatric:         Mood and Affect: Mood normal.         Thought Content: Thought content normal.         Judgment: " "Judgment normal.                       Assessment/Plan   Flaca Mayo \"Anni\" is a 73 y.o. female who presents to GI clinic for   Ulcerative colitis in clinical remission and when last scoped 8/2023 histological remission    Continue Entyvio [id number]    Continue mesalamine at the 1.2 gm she is currently doing.      Plan:    TSH  CBC  CRP  CMP        Hood Collier MD         "

## 2024-07-22 NOTE — PROGRESS NOTES
FOLLOW-UP VISIT     PROBLEM LIST:  1. Renal cyst       HISTORY OF PRESENT ILLNESS:   Flaca Mayo is a 73 y.o. female who was last seen on 7/13/24 for follow-up visit with a history of kidney stones. She has a history of ulcerative colitis but has this well managed. She denies experiencing hematuria or pain.    She states she had some discharge in January and was positive for strep throat. A course of antibiotics made the discharge stop. Her doctor at that visit prescribed her Estrace 0.01% vaginal cream. She has concerns to use Estrace because of a previous heart attack.     PAST MEDICAL HISTORY:  Past Medical History:   Diagnosis Date    Cramp and spasm     Muscle cramps    H. pylori infection 10/13/2023    Mixed hyperlipidemia 08/23/2020    Combined hyperlipidemia    Nondisplaced fracture of olecranon process without intraarticular extension of left ulna, subsequent encounter for closed fracture with routine healing 11/12/2017    Closed nondisplaced fracture of olecranon process of left ulna without intra-articular extension with routine healing, subsequent encounter    Other conditions influencing health status     Right handed    Personal history of other (healed) physical injury and trauma 07/31/2014    History of sprain of ankle    Personal history of other diseases of the nervous system and sense organs     History of sleep apnea    Sleep disorder, unspecified     Sleep disturbances    Unspecified systolic (congestive) heart failure (Multi) 10/23/2021    Systolic CHF       PAST SURGICAL HISTORY:  Past Surgical History:   Procedure Laterality Date    CATARACT EXTRACTION  06/09/2016    Cataract Extraction    COLONOSCOPY  08/02/2016    Colonoscopy (Fiberoptic)    ESOPHAGOGASTRODUODENOSCOPY  08/02/2016    Diagnostic Esophagogastroduodenoscopy    OTHER SURGICAL HISTORY  12/31/2014    Ovarian Cystectomy        ALLERGIES:   No Known Allergies     MEDICATIONS:   [unfilled]     SOCIAL HISTORY:  Patient   "reports that she has never smoked. She has never used smokeless tobacco. She reports current alcohol use of about 6.0 standard drinks of alcohol per week. She reports that she does not currently use drugs.   Social History     Socioeconomic History    Marital status:      Spouse name: Not on file    Number of children: Not on file    Years of education: Not on file    Highest education level: Not on file   Occupational History    Not on file   Tobacco Use    Smoking status: Never    Smokeless tobacco: Never   Vaping Use    Vaping status: Never Used   Substance and Sexual Activity    Alcohol use: Yes     Alcohol/week: 6.0 standard drinks of alcohol     Types: 6 Glasses of wine per week    Drug use: Not Currently    Sexual activity: Yes     Partners: Male     Birth control/protection: Post-menopausal   Other Topics Concern    Not on file   Social History Narrative    Not on file     Social Determinants of Health     Financial Resource Strain: Not on file   Food Insecurity: Not on file   Transportation Needs: Not on file   Physical Activity: Not on file   Stress: Not on file   Social Connections: Not on file   Intimate Partner Violence: Not on file   Housing Stability: Not on file       FAMILY HISTORY:  Family History   Problem Relation Name Age of Onset    Basal cell carcinoma Parent         REVIEW OF SYSTEMS:  Constitutional: Negative for fever and chills. Denies anorexia, weight loss.  Eyes: Negative for visual disturbance.   Respiratory: Negative for shortness of breath.    Cardiovascular: Negative for chest pain.   Gastrointestinal: Negative for nausea and vomiting.   Genitourinary: See interval history above.  Skin: Negative for rash.   Neurological: Negative for dizziness and numbness.   Psychiatric/Behavioral: Negative for confusion and decreased concentration.     PHYSICAL EXAM:  Blood pressure 145/85, pulse 60, temperature 36.4 °C (97.5 °F), height 1.676 m (5' 6\"), weight 65.8 kg (145 " lb).  Constitutional: Patient appears well-developed and well-nourished. No distress.    Head: Normocephalic and atraumatic.    Neck: Normal range of motion.    Cardiovascular: Normal rate.    Pulmonary/Chest: Effort normal. No respiratory distress.   Musculoskeletal: Normal range of motion.    Neurological: Alert and oriented to person, place, and time.  Psychiatric: Normal mood and affect. Behavior is normal. Thought content normal.       Assessment:      1. Atrophic vaginitis        2. Kidney stones        3. Genitourinary syndrome of menopause        4. Renal cyst  US renal complete          Flaca Mayo is a 73 y.o. female with renal cysts.    We discussed that there is no linkage of cardiovascular risk with topical estrogen    Plan:   We will order a renal ultrasound to look at her kidneys.   Refilled her Estrace 0.01% cream to use 3 times a week sent to the patient's pharmacy.   Follow up in 6-12 months depending on her ultrasound results.     Ibeth Rodriguez MD MPH       Scribe Attestation  By signing my name below, IBbiiana, Scribehsan   attest that this documentation has been prepared under the direction and in the presence of Ibeth Rodriguez MD MPH.

## 2024-07-25 ENCOUNTER — APPOINTMENT (OUTPATIENT)
Dept: UROLOGY | Facility: CLINIC | Age: 74
End: 2024-07-25
Payer: COMMERCIAL

## 2024-07-25 VITALS
HEIGHT: 66 IN | HEART RATE: 60 BPM | TEMPERATURE: 97.5 F | SYSTOLIC BLOOD PRESSURE: 145 MMHG | BODY MASS INDEX: 23.3 KG/M2 | DIASTOLIC BLOOD PRESSURE: 85 MMHG | WEIGHT: 145 LBS

## 2024-07-25 DIAGNOSIS — N95.8 GENITOURINARY SYNDROME OF MENOPAUSE: ICD-10-CM

## 2024-07-25 DIAGNOSIS — N28.1 RENAL CYST: ICD-10-CM

## 2024-07-25 DIAGNOSIS — N20.0 KIDNEY STONES: ICD-10-CM

## 2024-07-25 DIAGNOSIS — N95.2 ATROPHIC VAGINITIS: ICD-10-CM

## 2024-07-25 PROCEDURE — 3079F DIAST BP 80-89 MM HG: CPT | Performed by: OBSTETRICS & GYNECOLOGY

## 2024-07-25 PROCEDURE — 3077F SYST BP >= 140 MM HG: CPT | Performed by: OBSTETRICS & GYNECOLOGY

## 2024-07-25 PROCEDURE — 1126F AMNT PAIN NOTED NONE PRSNT: CPT | Performed by: OBSTETRICS & GYNECOLOGY

## 2024-07-25 PROCEDURE — 1036F TOBACCO NON-USER: CPT | Performed by: OBSTETRICS & GYNECOLOGY

## 2024-07-25 PROCEDURE — 3008F BODY MASS INDEX DOCD: CPT | Performed by: OBSTETRICS & GYNECOLOGY

## 2024-07-25 PROCEDURE — 1159F MED LIST DOCD IN RCRD: CPT | Performed by: OBSTETRICS & GYNECOLOGY

## 2024-07-25 PROCEDURE — 99214 OFFICE O/P EST MOD 30 MIN: CPT | Performed by: OBSTETRICS & GYNECOLOGY

## 2024-07-25 RX ORDER — ESTRADIOL 0.1 MG/G
CREAM VAGINAL
Qty: 42.5 G | Refills: 3 | Status: SHIPPED | OUTPATIENT
Start: 2024-07-25

## 2024-07-25 ASSESSMENT — PAIN SCALES - GENERAL: PAINLEVEL: 0-NO PAIN

## 2024-07-30 ENCOUNTER — HOSPITAL ENCOUNTER (OUTPATIENT)
Dept: RADIOLOGY | Facility: HOSPITAL | Age: 74
Discharge: HOME | End: 2024-07-30
Payer: COMMERCIAL

## 2024-07-30 DIAGNOSIS — N28.1 RENAL CYST: ICD-10-CM

## 2024-07-30 PROCEDURE — 76770 US EXAM ABDO BACK WALL COMP: CPT | Performed by: RADIOLOGY

## 2024-07-30 PROCEDURE — 76770 US EXAM ABDO BACK WALL COMP: CPT

## 2024-08-20 ENCOUNTER — APPOINTMENT (OUTPATIENT)
Dept: CARDIOLOGY | Facility: CLINIC | Age: 74
End: 2024-08-20
Payer: COMMERCIAL

## 2024-08-20 ENCOUNTER — LAB (OUTPATIENT)
Dept: LAB | Facility: LAB | Age: 74
End: 2024-08-20
Payer: COMMERCIAL

## 2024-08-20 ENCOUNTER — OFFICE VISIT (OUTPATIENT)
Dept: CARDIOLOGY | Facility: CLINIC | Age: 74
End: 2024-08-20
Payer: COMMERCIAL

## 2024-08-20 VITALS
SYSTOLIC BLOOD PRESSURE: 165 MMHG | BODY MASS INDEX: 23.85 KG/M2 | DIASTOLIC BLOOD PRESSURE: 78 MMHG | WEIGHT: 148.4 LBS | HEIGHT: 66 IN | HEART RATE: 65 BPM | OXYGEN SATURATION: 100 %

## 2024-08-20 DIAGNOSIS — I25.10 ASCVD (ARTERIOSCLEROTIC CARDIOVASCULAR DISEASE): ICD-10-CM

## 2024-08-20 DIAGNOSIS — R07.9 CHEST PAIN, UNSPECIFIED TYPE: ICD-10-CM

## 2024-08-20 DIAGNOSIS — I20.89 ANGINA OF EFFORT (CMS-HCC): ICD-10-CM

## 2024-08-20 DIAGNOSIS — R07.9 CHEST PAIN, UNSPECIFIED TYPE: Primary | ICD-10-CM

## 2024-08-20 LAB
ANION GAP SERPL CALC-SCNC: 11 MMOL/L (ref 10–20)
BUN SERPL-MCNC: 20 MG/DL (ref 6–23)
CALCIUM SERPL-MCNC: 9.7 MG/DL (ref 8.6–10.6)
CHLORIDE SERPL-SCNC: 104 MMOL/L (ref 98–107)
CO2 SERPL-SCNC: 31 MMOL/L (ref 21–32)
CREAT SERPL-MCNC: 0.7 MG/DL (ref 0.5–1.05)
EGFRCR SERPLBLD CKD-EPI 2021: >90 ML/MIN/1.73M*2
ERYTHROCYTE [DISTWIDTH] IN BLOOD BY AUTOMATED COUNT: 12.1 % (ref 11.5–14.5)
GLUCOSE SERPL-MCNC: 87 MG/DL (ref 74–99)
HCT VFR BLD AUTO: 44.8 % (ref 36–46)
HGB BLD-MCNC: 14.8 G/DL (ref 12–16)
MCH RBC QN AUTO: 31.3 PG (ref 26–34)
MCHC RBC AUTO-ENTMCNC: 33 G/DL (ref 32–36)
MCV RBC AUTO: 95 FL (ref 80–100)
NRBC BLD-RTO: 0 /100 WBCS (ref 0–0)
PLATELET # BLD AUTO: 281 X10*3/UL (ref 150–450)
POTASSIUM SERPL-SCNC: 4.5 MMOL/L (ref 3.5–5.3)
RBC # BLD AUTO: 4.73 X10*6/UL (ref 4–5.2)
SODIUM SERPL-SCNC: 141 MMOL/L (ref 136–145)
WBC # BLD AUTO: 5.8 X10*3/UL (ref 4.4–11.3)

## 2024-08-20 PROCEDURE — 93010 ELECTROCARDIOGRAM REPORT: CPT | Performed by: INTERNAL MEDICINE

## 2024-08-20 PROCEDURE — 1159F MED LIST DOCD IN RCRD: CPT | Performed by: HOSPITALIST

## 2024-08-20 PROCEDURE — 36415 COLL VENOUS BLD VENIPUNCTURE: CPT

## 2024-08-20 PROCEDURE — 99215 OFFICE O/P EST HI 40 MIN: CPT | Performed by: HOSPITALIST

## 2024-08-20 PROCEDURE — 93005 ELECTROCARDIOGRAM TRACING: CPT | Performed by: HOSPITALIST

## 2024-08-20 PROCEDURE — 3008F BODY MASS INDEX DOCD: CPT | Performed by: HOSPITALIST

## 2024-08-20 PROCEDURE — 3078F DIAST BP <80 MM HG: CPT | Performed by: HOSPITALIST

## 2024-08-20 PROCEDURE — 3077F SYST BP >= 140 MM HG: CPT | Performed by: HOSPITALIST

## 2024-08-20 PROCEDURE — 1036F TOBACCO NON-USER: CPT | Performed by: HOSPITALIST

## 2024-08-20 PROCEDURE — 80048 BASIC METABOLIC PNL TOTAL CA: CPT

## 2024-08-20 PROCEDURE — 85027 COMPLETE CBC AUTOMATED: CPT

## 2024-08-20 PROCEDURE — 1126F AMNT PAIN NOTED NONE PRSNT: CPT | Performed by: HOSPITALIST

## 2024-08-20 RX ORDER — NITROGLYCERIN 0.4 MG/1
0.4 TABLET SUBLINGUAL EVERY 5 MIN PRN
Qty: 100 TABLET | Refills: 11 | Status: SHIPPED | OUTPATIENT
Start: 2024-08-20 | End: 2025-08-20

## 2024-08-20 ASSESSMENT — COLUMBIA-SUICIDE SEVERITY RATING SCALE - C-SSRS
6. HAVE YOU EVER DONE ANYTHING, STARTED TO DO ANYTHING, OR PREPARED TO DO ANYTHING TO END YOUR LIFE?: NO
1. IN THE PAST MONTH, HAVE YOU WISHED YOU WERE DEAD OR WISHED YOU COULD GO TO SLEEP AND NOT WAKE UP?: NO
2. HAVE YOU ACTUALLY HAD ANY THOUGHTS OF KILLING YOURSELF?: NO

## 2024-08-20 ASSESSMENT — PAIN SCALES - GENERAL: PAINLEVEL: 0-NO PAIN

## 2024-08-20 ASSESSMENT — PATIENT HEALTH QUESTIONNAIRE - PHQ9
SUM OF ALL RESPONSES TO PHQ9 QUESTIONS 1 AND 2: 0
1. LITTLE INTEREST OR PLEASURE IN DOING THINGS: NOT AT ALL
2. FEELING DOWN, DEPRESSED OR HOPELESS: NOT AT ALL

## 2024-08-20 NOTE — PATIENT INSTRUCTIONS
Thank you so much for visiting us today.    We are going to proceed with a coronary angiogram.    Please avoid any activities that would bring your chest pain on, take nitroglycerin sublingual 1 tablet every 5 minutes for maximum 3 tablets in a row.  If chest pain does not get relief, then call 911.    Please call us at [phone] if you have any questions.

## 2024-08-20 NOTE — PROGRESS NOTES
"Payal Mayo \"Anni\" is a 73 y.o. female with past medical history of ulcerative colitis, kidney stones, and anterior STEMI status post IVUS-guided PCI to proximal LAD using 4.0 x 32 mm WAQAS [post dilated with 5.0 NC] with excellent results on 10/10/2021 by Dr. Roberson [LVEF 35% with akinesis in the LAD territory at time of presentation, recovered later]. Patient is here for routine follow-up.  Patient is a retired /teacher.  Patient is very physically active for her age, she does strength training twice a week, and she also does hiking and riding bike 2-3 times a week when weather permits.  However, recently she noticed VELASQUEZ and chest discomfort radiating up to the neck with walking at fast pace or climbing uphill.  This has been on for last few months, about twice per week depending on what she does, this reminds her of her pre heart attack symptoms.  Patient is compliant with her medications.  She denies palpitation, orthopnea, or PND.  Blood pressure today is within normal limits..  Last lipid panel from 5/2022 with triglyceride of 81, LDL of 71, HDL of 63.  Patient quit smoking 5 years ago.     EKG in the clinic on 8/20/2024, reviewed by myself, showed sinus bradycardia at 57 bpm, and nonspecific T changes.    EKG in the clinic on 7/5/2022, reviewed by myself, showed sinus rhythm with PACs, normal EKG.     TTE in 11/18/2021 with LVEF of 55-60% from 35% on 10/11, apex and distal septum are hypokinetic [apex was akinetic before, LAD territory was hypokinetic].    PCI 10/10/91698:  1. Single vessel coronary artery disease with proximal left anterior descending involvement.   2. Culprit vessel(s): left anterior descending.   3. Left Ventricular end-diastolic pressure = 24.      Review of Systems  ROS is negative other than in HPI.      Objective   Physical Exam  General: NAD  HEENT: IEOM, PERRL   Neck: No JVD or carotid bruit  Lungs: CTAB  Heart: RRR, normal S1 and S2, no loud " murmurs  Abdomen: Soft, nontender, positive bowel sounds  Extremities: No edema  Neurologic: No FND  Psychiatric: Normal mood and affect    Assessment/Plan   1-New onset angina:  -Pt had an anterior STEMI treated with IVUS-guided PCI to the LAD using 4.0 x 32 mm WAQAS (post dilated with 5.0 NC) with excellent results on 10/10/2021.  -LVEF 35% with akinesis in the LAD territory, and had recovered to 55-60% most recent echocardiogram in 11/20/2021, only distal septum and apex are hypokinetic.  -Patient has recurrence of her MI symptoms including VELASQUEZ and chest discomfort, see HPI for details.  -Will proceed with coronary angiogram next week at [facility].  Patient was advised for symptoms limited activities.  ED precaution.  -Continue aspirin 81 mg daily for life for, metoprolol 12.5 mg once daily, and losartan 25 mg once daily.  Nitroglycerin as needed.  -Aggressive healthy lifestyle modifications and risk factors control. Patient not a smoker. Lipids numbers are much improved.  -We will check lipid panel with next visit.     2-ICM:  -Resolved as above.  -No signs or symptoms of heart failure.  -Continue beta-blocker and losartan as above.     3-other medical problems are chronic and stable, further management as per PCP.     Lizette Escalera MD

## 2024-08-22 LAB
ATRIAL RATE: 57 BPM
P AXIS: -15 DEGREES
P OFFSET: 206 MS
P ONSET: 156 MS
PR INTERVAL: 136 MS
Q ONSET: 224 MS
QRS COUNT: 10 BEATS
QRS DURATION: 74 MS
QT INTERVAL: 408 MS
QTC CALCULATION(BAZETT): 397 MS
QTC FREDERICIA: 401 MS
R AXIS: 36 DEGREES
T AXIS: 77 DEGREES
T OFFSET: 428 MS
VENTRICULAR RATE: 57 BPM

## 2024-08-23 NOTE — H&P
"History Of Present Illness  Flaca Mayo \"Anni\" is a 73 y.o. female presenting with angina, hx of STEMI 10/10/2021 s/p PCI LAD, ICM, here for [facility]. PMH includes UC, kidney stones    Past Medical History:  Past Medical History:   Diagnosis Date    Cramp and spasm     Muscle cramps    H. pylori infection 10/13/2023    Mixed hyperlipidemia 08/23/2020    Combined hyperlipidemia    Nondisplaced fracture of olecranon process without intraarticular extension of left ulna, subsequent encounter for closed fracture with routine healing 11/12/2017    Closed nondisplaced fracture of olecranon process of left ulna without intra-articular extension with routine healing, subsequent encounter    Other conditions influencing health status     Right handed    Personal history of other (healed) physical injury and trauma 07/31/2014    History of sprain of ankle    Personal history of other diseases of the nervous system and sense organs     History of sleep apnea    Sleep disorder, unspecified     Sleep disturbances    Unspecified systolic (congestive) heart failure (Multi) 10/23/2021    Systolic CHF        Past Surgical History:  Past Surgical History:   Procedure Laterality Date    CATARACT EXTRACTION  06/09/2016    Cataract Extraction    COLONOSCOPY  08/02/2016    Colonoscopy (Fiberoptic)    ESOPHAGOGASTRODUODENOSCOPY  08/02/2016    Diagnostic Esophagogastroduodenoscopy    OTHER SURGICAL HISTORY  12/31/2014    Ovarian Cystectomy          Social History:   reports that she has never smoked. She has never used smokeless tobacco. She reports current alcohol use of about 6.0 standard drinks of alcohol per week. She reports that she does not currently use drugs.     Family History:  Family History   Problem Relation Name Age of Onset    Basal cell carcinoma Parent          Allergies:  No Known Allergies     Home Medications:  Current Outpatient Medications   Medication Instructions    aspirin 81 mg EC tablet 1 tablet, oral, " Daily    atorvastatin (LIPITOR) 80 mg, oral, Nightly    BACILLUS COAGULANS-INULIN ORAL oral    cholecalciferol (Vitamin D-3) 50 mcg (2,000 unit) capsule Vitamin D3 50 MCG (2000 UT) Oral Capsule   Refills: 0       Active    cycloSPORINE (Restasis) 0.05 % ophthalmic emulsion 1 drop, ophthalmic (eye), 2 times daily    dicyclomine (Bentyl) 10 mg capsule TAKE 1 CAPSULE 3 TIMES DAILY AS NEEDED FOR ABD SPASM    docosahexaenoic acid/epa (FISH OIL ORAL) 1 capsule, oral, Daily    estradiol (Estrace) 0.01 % (0.1 mg/gram) vaginal cream Insert a pea-sized amount into vagina three times per week at bedtime    estradiol (ESTRACE) 1 g, vaginal, Every other day    ketoconazole (NIZOral) 2 % cream Topical, 2 times daily, Angles of mouth x2-4 weeks for flares    losartan (COZAAR) 25 mg, oral, Daily    mesalamine (LIALDA) 4.8 g, oral, Daily    metoprolol tartrate (LOPRESSOR) 12.5 mg, oral, 2 times daily    nitroglycerin (NITROSTAT) 0.4 mg, sublingual, Every 5 min PRN, May repeat dose every 5 minutes for up to 3 doses total.    SACCHAROMYCES BOULARDII ORAL oral    vedolizumab (ENTYVIO) 300 mg, intravenous, every 6 weeks.<BR>       Inpatient Medications:  Scheduled medications   Medication Dose Route Frequency    aspirin  81 mg oral Once     PRN medications   Medication     Continuous Medications   Medication Dose Last Rate    sodium chloride 0.9%  75 mL/hr 75 mL/hr (08/28/24 0837)         Review of Systems   Constitutional:  Positive for fatigue.   HENT: Negative.     Eyes: Negative.    Respiratory: Negative.     Cardiovascular: Negative.    Gastrointestinal: Negative.    Endocrine: Negative.    Genitourinary: Negative.    Musculoskeletal: Negative.    Skin: Negative.    Allergic/Immunologic: Negative.    Neurological: Negative.    Hematological: Negative.    Psychiatric/Behavioral: Negative.            Physical Exam  Constitutional:       General: She is awake. She is not in acute distress.     Appearance: She is not ill-appearing.    Cardiovascular:      Rate and Rhythm: Normal rate and regular rhythm.      Pulses: Normal pulses.           Radial pulses are 2+ on the right side and 2+ on the left side.        Dorsalis pedis pulses are 2+ on the right side and 2+ on the left side.      Heart sounds: Normal heart sounds. No murmur heard.  Pulmonary:      Effort: Pulmonary effort is normal.      Breath sounds: Normal breath sounds and air entry.   Abdominal:      General: Bowel sounds are normal.      Palpations: Abdomen is soft.      Tenderness: There is no abdominal tenderness.   Musculoskeletal:      Right lower leg: No edema.      Left lower leg: No edema.   Skin:     General: Skin is warm and dry.   Neurological:      General: No focal deficit present.      Mental Status: She is alert and oriented to person, place, and time.      GCS: GCS eye subscore is 4. GCS verbal subscore is 5. GCS motor subscore is 6.   Psychiatric:         Mood and Affect: Mood normal.         Behavior: Behavior is cooperative.        Sedation Plan    ASA 3     Mallampati class: III.           NPO since last night around 2000    Last Recorded Vitals  Blood pressure 170/76, pulse 63, temperature 36.9 °C (98.4 °F), temperature source Temporal, resp. rate 12, SpO2 97%.         Vitals from the Past 24 Hours  Heart Rate:  [63]   Temp:  [36.9 °C (98.4 °F)]   Resp:  [12]   BP: (170)/(76)   SpO2:  [97 %]          Relevant Results    Labs    CBC:   Recent Labs     08/20/24  1107 07/01/24  1605 06/09/23  1220 10/12/21  0306 10/11/21  0324 10/10/21  1156   WBC 5.8 6.7 5.6 8.0 11.0 8.0   HGB 14.8 14.1 14.3 13.2 13.4 14.2   HCT 44.8 43.8 44.6 40.2 42.1 44.5    249 257 222 292 329   MCV 95 97 95 94 95 98     BMP/CMP:   Recent Labs     08/20/24  1107 07/01/24  1605 06/09/23  1220 10/12/21  0306 10/11/21  0324 10/10/21  1156 11/04/20  1119 04/23/20  1200    144 145 141 141 142 146* 142   K 4.5 4.3 4.4 4.4 3.4* 4.1 4.3 4.4    108* 107 105 100 107 108* 106   BUN 20  "27* 24* 21 23 26* 27* 25*   CREATININE 0.70 0.70 0.68 0.63 0.69 0.71 0.73 0.66   CO2 31 31 31 29 29 24 32 30   CALCIUM 9.7 9.3 9.7 9.0 9.3 9.8 9.7 9.7   PROT  --  6.5 6.6  --  6.6  --  6.5 6.5   BILITOT  --  0.5 0.7  --  0.7  --  0.4 0.8   ALKPHOS  --  77 80  --  69  --  88 68   ALT  --  29 24  --  30  --  10 15   AST  --  34 29  --  153*  --  14 22   GLUCOSE 87 89 80 98 112* 138* 89 91      Lipid Panel:   Recent Labs     05/31/22  1037 10/11/21  0324   CHOL 151 250*   HDL 63.8 71.4   CHHDL 2.4 3.5   VLDL 16 24   TRIG 81 119     Cardiac       No lab exists for component: \"CK\", \"CKMBP\"   Hemoglobin A1C:   Recent Labs     10/10/21  1156   HGBA1C 5.1     TSH/ Free T4:   Recent Labs     07/01/24  1605 06/09/23  1220 04/23/20  1200 08/29/19  1113   TSH 0.85 1.07 1.23 1.05     Iron:   Recent Labs     11/04/20  1119   TIBC 235*   IRONSAT 41     Coag:     ABO: No results found for: \"ABO\"    Past Cardiology Tests (Last 3 Years):    EKG:  Recent Labs     08/20/24  1020 07/05/22  1004 10/12/21  0457   ATRRATE 57 61 77   VENTRATE 57 61 77   PRINT 136 148 134   QRSDUR 74 74 74   QTCFRED 401 402 469   QTCCALCB 397 402 488     Encounter Date: 08/20/24   ECG 12 lead (Clinic Performed)   Result Value    Ventricular Rate 57    Atrial Rate 57    FL Interval 136    QRS Duration 74    QT Interval 408    QTC Calculation(Bazett) 397    P Axis -15    R Axis 36    T Axis 77    QRS Count 10    Q Onset 224    P Onset 156    P Offset 206    T Offset 428    QTC Fredericia 401    Narrative    Sinus bradycardia  Nonspecific T wave abnormality  Abnormal ECG  When compared with ECG of 05-JUL-2022 10:04,  No significant change was found  Confirmed by Yadira Rios ([id number]) on 8/22/2024 4:09:59 PM     Echo:  Echocardiogram:   Echocardiogram     Study Date:       11/18/2021  PHYSICIAN INTERPRETATION:  Left Ventricle: The left ventricular systolic function is normal, with an estimated ejection fraction of 55-60%. Wall motion is abnormal. The left " "ventricular cavity size is normal. Left ventricular diastolic filling was not assessed.  LV Wall Scoring:  The apical septal segment and apex are hypokinetic.    Left Atrium: The left atrium was not assessed.  Right Ventricle: The right ventricle was not assessed. Right ventricular systolic function not assessed.  Right Atrium: The right atrium was not assessed.  Aortic Valve: The aortic valve was not assessed. There is indeterminate aortic valve regurgitation.  Mitral Valve: The mitral valve was not assessed. Mitral valve regurgitation was not assessed.  Tricuspid Valve: The tricuspid valve is structurally normal. Tricuspid regurgitation was not assessed.  Pulmonic Valve: The pulmonic valve was not assessed. The pulmonic valve regurgitation was not assessed.  Pericardium: There is no pericardial effusion noted.  Aorta: The aortic root was not assessed.  Systemic Veins: The inferior vena cava appears to be of normal size. There is IVC inspiratory collapse greater than 50%.  In comparison to the previous echocardiogram(s): Compared with study from 10/11/2021, LVEF has significantly improved.      CONCLUSIONS:  1. The left ventricular systolic function is normal with a 55-60% estimated ejection fraction.  2. Apical septal segment and apex are abnormal.      Ejection Fractions:  No results found for: \"EF\"  Cath:  Coronary Angiography:   Adult Cath   Study Date:       10/10/2021             Study: Left Heart Catheterization    Coronary Angiography:  The coronary circulation is right dominant.    Left Main Coronary Artery:  The left main coronary artery is a normal caliber vessel. The left main arises normally from the left coronary sinus of Valsalva and bifurcates into the LAD and circumflex coronary arteries. The left main coronary artery showed no significant disease or stenosis greater than 30%.    Left Anterior Descending Coronary Artery Distribution:  The left anterior descending coronary artery is a normal caliber " vessel. The LAD arises normally from the left main coronary artery. The proximal left anterior descending coronary artery showed 100% stenosis.    Circumflex Coronary Artery Distribution:  The circumflex coronary artery is a normal caliber vessel. The circumflex arises normally from the left main coronary artery and terminates in the AV groove. The circumflex revealed no significant disease or stenosis greater than 30%.    Right Coronary Artery Distribution:    The right coronary artery is a normal caliber vessel. The RCA arises normally from the right sinus of Valsalva. The RCA showed no significant disease or stenosis greater than 30%.    Coronary Interventions:  Angiography reveals a 100% stenosis of the proximal left anterior descending. Pre-intervention YEIMI flow was 0. Percutaneous coronary intervention was performed within the proximal left anterior descending. The vessel was pre-dilated using a compliant balloon 2.5 mm x 15 mm at 10 ANAT. Synergy 4.0 mm x 32 mm was advanced to the lesion and implanted at 12 ANAT. The stent was post dilated using a non-compliant balloon 5.15 mm x 15 mm at 12 ANTA. Additional dilatation was performed using a compliant balloon 4.0 mm x 12 mm at 10 ANAT. The stenosis was successfully reduced from 100% to 0%. Post-intervention YEIMI flow was 3. EBU 3.5 engaged LCA. Theraputic ACT was maintained with Heparin boluses. Runthrough wire was advanced smoothly to distal LAD. Predilatation with 2.5 x 15 mm compliant balloon was performed at 10 ANAT with restoration of YEIMI II flow. This was followed by deployment of WAQAS Synergy 4 x 32 mm at 12 ANAT at proximal LAD back into ostial LAD. Post dilatation with NC 5.15 x 15  followed at 12 ANAT. IVUS showed proximal stent underexpansion. Subsequently, additional post dilation of proximal stent with NC 5.15 x 15 followed at 10 ANAT. Angiogram showed haziness at distal segment of the stent (tissue prolapse on IVUS) therefore we post dilated that area  with semi compliant 4 x 12 mm balloon at 10 ANAT with improvement in angiographic appearance and YEIMI III flow. Final angiogram showed excellent result without immediate complications.      Coronary Lesion Summary:  Vessel   Stenosis    Vessel Segment  LAD    100% stenosis    proximal      Complications:  No in-lab complications observed.    Cardiac Cath Transition of Care Summary:  Post Procedure Diagnosis: PCI to proximal LAD for anterolateral STEMI.  Blood Loss:               Estimated blood loss during the procedure was 10 ml  mls.  Specimens Removed:        Number of specimen(s) removed: none.  Comments:                 CICU admission.      Recommendations:  Agressive risk factor modification efforts.  Anti-platelet therapy with Aspirin and Ticagrelor 90mgs BID.  Lipid lowering agent or Statin therapy.  Consider referral to cardiac rehabilitation.    ____________________________________________________________________________________  CONCLUSIONS:  1. Single vessel coronary artery disease with proximal left anterior descending involvement.  2. Culprit vessel(s): left anterior descending.  3. Left Ventricular end-diastolic pressure = 24.      Right Heart Cath: No results found for this or any previous visit from the past 1800 days.    Stress Test:  Nuclear:No results found for this or any previous visit from the past 1800 days.    Metabolic Stress: No results found for this or any previous visit from the past 1800 days.    Cardiac Imaging:  Cardiac Scoring: No results found for this or any previous visit from the past 1800 days.    Cardiac MRI: No results found for this or any previous visit from the past 1800 days.         Assessment/Plan  Assessment/Plan   Principal Problem:    Chest pain        angina, hx of STEMI 10/10/2021 s/p PCI LAD, [facility]  -[facility] with Dr. Escalera on 8/23/24  -asa prior to procedure       NP discussed with Dr. Escalera regarding plan of care/ discharge plan      I spent 30 minutes in the  professional and overall care of this patient.      Enrique Maria, APRN-CNP, DNP

## 2024-08-28 ENCOUNTER — HOSPITAL ENCOUNTER (OUTPATIENT)
Facility: HOSPITAL | Age: 74
Setting detail: OUTPATIENT SURGERY
Discharge: HOME | End: 2024-08-28
Attending: HOSPITALIST | Admitting: HOSPITALIST
Payer: COMMERCIAL

## 2024-08-28 VITALS
OXYGEN SATURATION: 98 % | SYSTOLIC BLOOD PRESSURE: 123 MMHG | HEART RATE: 55 BPM | RESPIRATION RATE: 14 BRPM | DIASTOLIC BLOOD PRESSURE: 55 MMHG | TEMPERATURE: 98.4 F

## 2024-08-28 DIAGNOSIS — I20.89 OTHER FORMS OF ANGINA PECTORIS (CMS-HCC): ICD-10-CM

## 2024-08-28 DIAGNOSIS — R07.9 CHEST PAIN, UNSPECIFIED TYPE: Primary | ICD-10-CM

## 2024-08-28 PROCEDURE — 99152 MOD SED SAME PHYS/QHP 5/>YRS: CPT | Performed by: HOSPITALIST

## 2024-08-28 PROCEDURE — C1769 GUIDE WIRE: HCPCS | Performed by: HOSPITALIST

## 2024-08-28 PROCEDURE — 93458 L HRT ARTERY/VENTRICLE ANGIO: CPT | Performed by: HOSPITALIST

## 2024-08-28 PROCEDURE — 2500000005 HC RX 250 GENERAL PHARMACY W/O HCPCS: Performed by: HOSPITALIST

## 2024-08-28 PROCEDURE — 2720000007 HC OR 272 NO HCPCS: Performed by: HOSPITALIST

## 2024-08-28 PROCEDURE — C1760 CLOSURE DEV, VASC: HCPCS | Performed by: HOSPITALIST

## 2024-08-28 PROCEDURE — 2500000004 HC RX 250 GENERAL PHARMACY W/ HCPCS (ALT 636 FOR OP/ED): Performed by: NURSE PRACTITIONER

## 2024-08-28 PROCEDURE — 2550000001 HC RX 255 CONTRASTS: Performed by: HOSPITALIST

## 2024-08-28 PROCEDURE — 2500000004 HC RX 250 GENERAL PHARMACY W/ HCPCS (ALT 636 FOR OP/ED): Performed by: HOSPITALIST

## 2024-08-28 PROCEDURE — 96373 THER/PROPH/DIAG INJ IA: CPT | Performed by: HOSPITALIST

## 2024-08-28 PROCEDURE — C1894 INTRO/SHEATH, NON-LASER: HCPCS | Performed by: HOSPITALIST

## 2024-08-28 PROCEDURE — 7100000009 HC PHASE TWO TIME - INITIAL BASE CHARGE: Performed by: HOSPITALIST

## 2024-08-28 PROCEDURE — 99223 1ST HOSP IP/OBS HIGH 75: CPT | Performed by: NURSE PRACTITIONER

## 2024-08-28 PROCEDURE — 7100000010 HC PHASE TWO TIME - EACH INCREMENTAL 1 MINUTE: Performed by: HOSPITALIST

## 2024-08-28 RX ORDER — SODIUM CHLORIDE 9 MG/ML
150 INJECTION, SOLUTION INTRAVENOUS CONTINUOUS
Status: ACTIVE | OUTPATIENT
Start: 2024-08-28 | End: 2024-08-28

## 2024-08-28 RX ORDER — ACETAMINOPHEN 650 MG/1
650 SUPPOSITORY RECTAL EVERY 6 HOURS PRN
Status: DISCONTINUED | OUTPATIENT
Start: 2024-08-28 | End: 2024-08-28 | Stop reason: HOSPADM

## 2024-08-28 RX ORDER — NAPROXEN SODIUM 220 MG/1
81 TABLET, FILM COATED ORAL ONCE
Status: DISCONTINUED | OUTPATIENT
Start: 2024-08-28 | End: 2024-08-28 | Stop reason: HOSPADM

## 2024-08-28 RX ORDER — ACETAMINOPHEN 160 MG/5ML
650 SOLUTION ORAL EVERY 6 HOURS PRN
Status: DISCONTINUED | OUTPATIENT
Start: 2024-08-28 | End: 2024-08-28 | Stop reason: HOSPADM

## 2024-08-28 RX ORDER — NITROGLYCERIN 40 MG/100ML
INJECTION INTRAVENOUS AS NEEDED
Status: DISCONTINUED | OUTPATIENT
Start: 2024-08-28 | End: 2024-08-28 | Stop reason: HOSPADM

## 2024-08-28 RX ORDER — MIDAZOLAM HYDROCHLORIDE 1 MG/ML
INJECTION, SOLUTION INTRAMUSCULAR; INTRAVENOUS AS NEEDED
Status: DISCONTINUED | OUTPATIENT
Start: 2024-08-28 | End: 2024-08-28 | Stop reason: HOSPADM

## 2024-08-28 RX ORDER — HEPARIN SODIUM 1000 [USP'U]/ML
INJECTION, SOLUTION INTRAVENOUS; SUBCUTANEOUS AS NEEDED
Status: DISCONTINUED | OUTPATIENT
Start: 2024-08-28 | End: 2024-08-28 | Stop reason: HOSPADM

## 2024-08-28 RX ORDER — SODIUM CHLORIDE 9 MG/ML
75 INJECTION, SOLUTION INTRAVENOUS CONTINUOUS
Status: DISCONTINUED | OUTPATIENT
Start: 2024-08-28 | End: 2024-08-28

## 2024-08-28 RX ORDER — ACETAMINOPHEN 325 MG/1
650 TABLET ORAL EVERY 6 HOURS PRN
Status: DISCONTINUED | OUTPATIENT
Start: 2024-08-28 | End: 2024-08-28 | Stop reason: HOSPADM

## 2024-08-28 RX ORDER — LIDOCAINE HYDROCHLORIDE 10 MG/ML
INJECTION, SOLUTION EPIDURAL; INFILTRATION; INTRACAUDAL; PERINEURAL AS NEEDED
Status: DISCONTINUED | OUTPATIENT
Start: 2024-08-28 | End: 2024-08-28 | Stop reason: HOSPADM

## 2024-08-28 RX ORDER — FENTANYL CITRATE 50 UG/ML
INJECTION, SOLUTION INTRAMUSCULAR; INTRAVENOUS AS NEEDED
Status: DISCONTINUED | OUTPATIENT
Start: 2024-08-28 | End: 2024-08-28 | Stop reason: HOSPADM

## 2024-08-28 ASSESSMENT — PAIN SCALES - GENERAL

## 2024-08-28 ASSESSMENT — ENCOUNTER SYMPTOMS
GASTROINTESTINAL NEGATIVE: 1
FATIGUE: 1
RESPIRATORY NEGATIVE: 1
ENDOCRINE NEGATIVE: 1
NEUROLOGICAL NEGATIVE: 1
ALLERGIC/IMMUNOLOGIC NEGATIVE: 1
PSYCHIATRIC NEGATIVE: 1
CARDIOVASCULAR NEGATIVE: 1
MUSCULOSKELETAL NEGATIVE: 1
HEMATOLOGIC/LYMPHATIC NEGATIVE: 1
EYES NEGATIVE: 1

## 2024-08-28 ASSESSMENT — COLUMBIA-SUICIDE SEVERITY RATING SCALE - C-SSRS
1. IN THE PAST MONTH, HAVE YOU WISHED YOU WERE DEAD OR WISHED YOU COULD GO TO SLEEP AND NOT WAKE UP?: NO
2. HAVE YOU ACTUALLY HAD ANY THOUGHTS OF KILLING YOURSELF?: NO
6. HAVE YOU EVER DONE ANYTHING, STARTED TO DO ANYTHING, OR PREPARED TO DO ANYTHING TO END YOUR LIFE?: NO

## 2024-08-28 ASSESSMENT — PAIN SCALES - PAIN ASSESSMENT IN ADVANCED DEMENTIA (PAINAD): TOTALSCORE: OTHER (COMMENT)

## 2024-08-28 NOTE — POST-PROCEDURE NOTE
Physician Transition of Care Summary  Invasive Cardiovascular Lab    Procedure Date: 8/28/2024  Attending:    Bryan Escalera - Primary  Resident/Fellow/Other Assistant: Surgeons and Role:  * No surgeons found with a matching role *    Indications:   Pre-op Diagnosis      * Chest pain, unspecified type [R07.9]    Post-procedure diagnosis:   Post-op Diagnosis     * Chest pain, unspecified type [R07.9]    Procedure(s):   Left Heart Cath with Coronary Angiography and LV  62181 - [facility] CATH PLMT L HRT & ARTS W/NJX & ANGIO IMG S&I    Procedure Findings:   Minimal CAD with patent proximal LAD stent in a right-dominant system.  LVEDP is high normal at 16 mmHg    Access of the Procedure:   6 [place] RRA, closed with TR band.    Complications:   None.    Stents/Implants:   None.    Anticoagulation/Antiplatelet Plan:   Heparin bolus for radial access.    Estimated Blood Loss:   5 mL    Anesthesia: Moderate Sedation Anesthesia Staff: No anesthesia staff entered.    Any Specimen(s) Removed:   No specimens collected during this procedure.    Disposition:   -TR band removal and IV hydration per protocol.    -Low-salt diet.  -We will consider adding calcium channel blocker if symptoms persist.      Electronically signed by: Lizette Escalera MD, 8/28/2024 10:25 AM

## 2024-08-28 NOTE — Clinical Note
Closure device placed in the right radial artery. Site closed by radial compression system. TR band 17 ml of air

## 2024-09-03 ENCOUNTER — TELEPHONE (OUTPATIENT)
Dept: GASTROENTEROLOGY | Facility: CLINIC | Age: 74
End: 2024-09-03
Payer: COMMERCIAL

## 2024-09-03 NOTE — TELEPHONE ENCOUNTER
Pt states that at her infusion appointments she has to do blood work. The infusion center states you wrote the order to include labs. She would like to know if this is correct.

## 2024-09-04 DIAGNOSIS — K51.30 ULCERATIVE RECTOSIGMOIDITIS WITHOUT COMPLICATION (MULTI): Primary | ICD-10-CM

## 2024-09-05 ENCOUNTER — APPOINTMENT (OUTPATIENT)
Dept: CARDIOLOGY | Facility: HOSPITAL | Age: 74
End: 2024-09-05
Payer: COMMERCIAL

## 2024-09-26 ENCOUNTER — OFFICE VISIT (OUTPATIENT)
Dept: CARDIOLOGY | Facility: HOSPITAL | Age: 74
End: 2024-09-26
Payer: COMMERCIAL

## 2024-09-26 VITALS
HEART RATE: 63 BPM | DIASTOLIC BLOOD PRESSURE: 80 MMHG | SYSTOLIC BLOOD PRESSURE: 130 MMHG | HEIGHT: 66 IN | WEIGHT: 148.2 LBS | BODY MASS INDEX: 23.82 KG/M2 | OXYGEN SATURATION: 98 %

## 2024-09-26 DIAGNOSIS — Z95.5 STENTED CORONARY ARTERY: Primary | ICD-10-CM

## 2024-09-26 PROCEDURE — 3075F SYST BP GE 130 - 139MM HG: CPT | Performed by: HOSPITALIST

## 2024-09-26 PROCEDURE — 99212 OFFICE O/P EST SF 10 MIN: CPT | Performed by: HOSPITALIST

## 2024-09-26 PROCEDURE — 3008F BODY MASS INDEX DOCD: CPT | Performed by: HOSPITALIST

## 2024-09-26 PROCEDURE — 3079F DIAST BP 80-89 MM HG: CPT | Performed by: HOSPITALIST

## 2024-09-26 PROCEDURE — 1159F MED LIST DOCD IN RCRD: CPT | Performed by: HOSPITALIST

## 2024-09-26 NOTE — PATIENT INSTRUCTIONS
Thank you so much for visiting us today.    I am glad you are feeling well!    Will see you back in 1 year, feel free to call our office at [phone] meanwhile if you have any questions.

## 2024-09-26 NOTE — PROGRESS NOTES
"Payal Mayo \"Anni\" is a 73 y.o. female  with past medical history of ulcerative colitis, kidney stones, and anterior STEMI status post IVUS-guided PCI to proximal LAD using 4.0 x 32 mm WAQAS [post dilated with 5.0 NC] with excellent results on 10/10/2021 by Dr. Roberson [LVEF 35% with akinesis in the LAD territory at time of presentation, recovered later]. Patient is here for routine follow-up.  Patient is a retired /teacher.  During last visit, patient complained of new onset angina and therefore she underwent a heart catheterization 8/28/2024 which showed minimal CAD with patent proximal LAD stent.  Patient has not had any chest pain episodes since, although she is not doing the the uphill climbing.  However, patient is riding her bike few times a week, most recent for 36 miles with only 1 break without any chest pain or shortness of breath.  She denies any orthopnea, PND, dizziness, or palpitation.  Patient is compliant with her medications. Blood pressure today is within normal limits..  Last lipid panel from 5/2022 with triglyceride of 81, LDL of 71, HDL of 63.  Patient quit smoking 5 years ago.     Most recent blood work from 8/20/2024 was unremarkable BMP with a creatinine of 0.7 and unremarkable CBC.    Heart catheterization from 8/28/2024:  1. Minimal CAD with patent proximal LAD stent in a right-dominant system.   2. LVEDP is high normal at 16 mmHg.   3. We will consider adding calcium channel blocker if symptoms persist.    EKG in the clinic on 8/20/2024, reviewed by myself, showed sinus bradycardia at 57 bpm, and nonspecific T changes.     EKG in the clinic on 7/5/2022, reviewed by myself, showed sinus rhythm with PACs, normal EKG.     TTE in 11/18/2021 with LVEF of 55-60% from 35% on 10/11, apex and distal septum are hypokinetic [apex was akinetic before, LAD territory was hypokinetic].     PCI 10/10/82103:  1. Single vessel coronary artery disease with proximal " left anterior descending involvement.   2. Culprit vessel(s): left anterior descending.   3. Left Ventricular end-diastolic pressure = 24.         Review of Systems  ROS is negative other than in HPI.      Objective   Physical Exam  General: NAD  HEENT: IEOM, PERRL   Neck: No JVD or carotid bruit  Lungs: CTAB  Heart: RRR, normal S1 and S2, no loud murmurs  Abdomen: Soft, nontender, positive bowel sounds  Extremities: No edema  Neurologic: No FND  Psychiatric: Normal mood and affect    Assessment/Plan   1-CAD:  -Pt had an anterior STEMI treated with IVUS-guided PCI to the LAD using 4.0 x 32 mm WAQAS (post dilated with 5.0 NC) with excellent results on 10/10/2021.  -LVEF 35% with akinesis in the LAD territory, and had recovered to 55-60% most recent echocardiogram in 11/20/2021, only distal septum and apex are hypokinetic.  -Patient mentioned recurrence of her MI symptoms including VELASQUEZ and chest discomfort during her last visit, repeat coronary angiogram on 8/28/2024 with minimal CAD and patent LAD stent.  No more chest pain since.  -Continue aspirin 81 mg daily for life for, metoprolol 12.5 mg once daily, and losartan 25 mg once daily.  Nitroglycerin as needed.  -Aggressive healthy lifestyle modifications and risk factors control. Patient not a smoker. Lipids numbers are much improved.  -We will check lipid panel with next visit.     2-ICM:  -Resolved as above.  -No signs or symptoms of heart failure.  -Continue beta-blocker and losartan as above.     3-other medical problems are chronic and stable, further management as per PCP.       Lizette Escalera MD

## 2024-10-21 ENCOUNTER — APPOINTMENT (OUTPATIENT)
Dept: DERMATOLOGY | Facility: HOSPITAL | Age: 74
End: 2024-10-21
Payer: COMMERCIAL

## 2024-10-21 DIAGNOSIS — Z95.5 STENTED CORONARY ARTERY: Primary | ICD-10-CM

## 2024-10-22 RX ORDER — ASPIRIN 81 MG/1
81 TABLET ORAL DAILY
Qty: 90 TABLET | Refills: 3 | Status: SHIPPED | OUTPATIENT
Start: 2024-10-22 | End: 2025-10-22

## 2024-10-28 ENCOUNTER — LAB (OUTPATIENT)
Dept: LAB | Facility: LAB | Age: 74
End: 2024-10-28
Payer: COMMERCIAL

## 2024-10-31 ENCOUNTER — APPOINTMENT (OUTPATIENT)
Dept: CARDIOLOGY | Facility: HOSPITAL | Age: 74
End: 2024-10-31
Payer: COMMERCIAL

## 2024-11-26 DIAGNOSIS — I25.5 ISCHEMIC CARDIOMYOPATHY: ICD-10-CM

## 2024-11-26 DIAGNOSIS — I25.10 CORONARY ARTERY DISEASE INVOLVING NATIVE CORONARY ARTERY OF NATIVE HEART WITHOUT ANGINA PECTORIS: ICD-10-CM

## 2024-11-26 DIAGNOSIS — I21.3 ST ELEVATION MYOCARDIAL INFARCTION (STEMI), UNSPECIFIED ARTERY (MULTI): ICD-10-CM

## 2024-11-27 RX ORDER — METOPROLOL TARTRATE 25 MG/1
12.5 TABLET, FILM COATED ORAL 2 TIMES DAILY
Qty: 90 TABLET | Refills: 3 | Status: SHIPPED | OUTPATIENT
Start: 2024-11-27 | End: 2025-11-27

## 2024-11-27 RX ORDER — LOSARTAN POTASSIUM 25 MG/1
25 TABLET ORAL DAILY
Qty: 90 TABLET | Refills: 3 | Status: SHIPPED | OUTPATIENT
Start: 2024-11-27 | End: 2025-11-27

## 2024-11-27 RX ORDER — ATORVASTATIN CALCIUM 80 MG/1
80 TABLET, FILM COATED ORAL NIGHTLY
Qty: 90 TABLET | Refills: 3 | Status: SHIPPED | OUTPATIENT
Start: 2024-11-27 | End: 2025-11-27

## 2024-12-02 DIAGNOSIS — K51.30 CHRONIC ULCERATIVE RECTOSIGMOIDITIS WITHOUT COMPLICATIONS (MULTI): ICD-10-CM

## 2024-12-02 RX ORDER — MESALAMINE 1.2 G/1
TABLET, DELAYED RELEASE ORAL
Qty: 360 TABLET | Refills: 1 | Status: SHIPPED | OUTPATIENT
Start: 2024-12-02

## 2024-12-03 ENCOUNTER — LAB (OUTPATIENT)
Dept: LAB | Facility: LAB | Age: 74
End: 2024-12-03
Payer: COMMERCIAL

## 2024-12-03 DIAGNOSIS — K51.30 ULCERATIVE RECTOSIGMOIDITIS WITHOUT COMPLICATION (MULTI): ICD-10-CM

## 2024-12-03 LAB
ANION GAP SERPL CALC-SCNC: 11 MMOL/L (ref 10–20)
BUN SERPL-MCNC: 14 MG/DL (ref 6–23)
CALCIUM SERPL-MCNC: 9.3 MG/DL (ref 8.6–10.6)
CHLORIDE SERPL-SCNC: 104 MMOL/L (ref 98–107)
CO2 SERPL-SCNC: 31 MMOL/L (ref 21–32)
CREAT SERPL-MCNC: 0.76 MG/DL (ref 0.5–1.05)
CRP SERPL-MCNC: 0.25 MG/DL
EGFRCR SERPLBLD CKD-EPI 2021: 83 ML/MIN/1.73M*2
ERYTHROCYTE [DISTWIDTH] IN BLOOD BY AUTOMATED COUNT: 12.3 % (ref 11.5–14.5)
GLUCOSE SERPL-MCNC: 93 MG/DL (ref 74–99)
HCT VFR BLD AUTO: 46.1 % (ref 36–46)
HGB BLD-MCNC: 14.8 G/DL (ref 12–16)
MCH RBC QN AUTO: 30.4 PG (ref 26–34)
MCHC RBC AUTO-ENTMCNC: 32.1 G/DL (ref 32–36)
MCV RBC AUTO: 95 FL (ref 80–100)
NRBC BLD-RTO: 0 /100 WBCS (ref 0–0)
PLATELET # BLD AUTO: 259 X10*3/UL (ref 150–450)
POTASSIUM SERPL-SCNC: 4.3 MMOL/L (ref 3.5–5.3)
RBC # BLD AUTO: 4.87 X10*6/UL (ref 4–5.2)
SODIUM SERPL-SCNC: 142 MMOL/L (ref 136–145)
WBC # BLD AUTO: 4.5 X10*3/UL (ref 4.4–11.3)

## 2024-12-03 PROCEDURE — 80048 BASIC METABOLIC PNL TOTAL CA: CPT

## 2024-12-03 PROCEDURE — 85027 COMPLETE CBC AUTOMATED: CPT

## 2024-12-03 PROCEDURE — 36415 COLL VENOUS BLD VENIPUNCTURE: CPT

## 2024-12-03 PROCEDURE — 86140 C-REACTIVE PROTEIN: CPT

## 2025-03-19 ENCOUNTER — APPOINTMENT (OUTPATIENT)
Dept: PRIMARY CARE | Facility: CLINIC | Age: 75
End: 2025-03-19
Payer: COMMERCIAL

## 2025-03-19 VITALS
HEIGHT: 66 IN | BODY MASS INDEX: 23.46 KG/M2 | SYSTOLIC BLOOD PRESSURE: 123 MMHG | DIASTOLIC BLOOD PRESSURE: 73 MMHG | WEIGHT: 146 LBS

## 2025-03-19 DIAGNOSIS — F41.9 ANXIETY: ICD-10-CM

## 2025-03-19 DIAGNOSIS — I10 BENIGN ESSENTIAL HYPERTENSION: ICD-10-CM

## 2025-03-19 DIAGNOSIS — F51.01 PRIMARY INSOMNIA: Primary | ICD-10-CM

## 2025-03-19 DIAGNOSIS — E78.49 ESSENTIAL FAMILIAL HYPERLIPIDEMIA: ICD-10-CM

## 2025-03-19 DIAGNOSIS — M79.604 RIGHT LEG PAIN: ICD-10-CM

## 2025-03-19 PROCEDURE — 3078F DIAST BP <80 MM HG: CPT | Performed by: INTERNAL MEDICINE

## 2025-03-19 PROCEDURE — 3008F BODY MASS INDEX DOCD: CPT | Performed by: INTERNAL MEDICINE

## 2025-03-19 PROCEDURE — 99204 OFFICE O/P NEW MOD 45 MIN: CPT | Performed by: INTERNAL MEDICINE

## 2025-03-19 PROCEDURE — 3074F SYST BP LT 130 MM HG: CPT | Performed by: INTERNAL MEDICINE

## 2025-03-19 RX ORDER — TRAZODONE HYDROCHLORIDE 50 MG/1
50 TABLET ORAL NIGHTLY PRN
Qty: 30 TABLET | Refills: 0 | Status: SHIPPED | OUTPATIENT
Start: 2025-03-19 | End: 2026-03-19

## 2025-03-19 NOTE — PROGRESS NOTES
Subjective   Patient ID: Anni Mayo is a 74 y.o. female who presents for No chief complaint on file..    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    Physical Exam vital signs noted alert and oriented x 3 NCAT no JVD or bruit chest clear to auscultation CV regular rate and rhythm S1-S2 abdomen soft nontender normal active bowel sounds LS spine nontender spinous process negative straight leg raise there is no right hip tenderness right knee full range of motion extremities no clubbing cyanosis or edema normal distal pulses DTR 1+    Assessment/Plan impression right leg pain insomnia anxiety other diagnoses htn hyperlipidemia  Plan she prefers some behavioral modification for anxiety and sleep okay for behavioral access clinic and then follow-up may consider medication for anxiety she would like to have some as needed medication for insomnia with sleep hygiene okay for trazodone 50 mg 1/2 to 1 tablet p.o. nightly as needed see EMR she has an orthopedic appointment tomorrow because she has had longer standing right leg pain she has no arthritic concerns in her hands or other areas posture is good continue with good overall diet and exercise and recheck for regular physical examination and screening  and blood work for lipids and advise on medication and follow-up sooner as needed as above  tt50 cc26

## 2025-03-20 ENCOUNTER — HOSPITAL ENCOUNTER (OUTPATIENT)
Dept: RADIOLOGY | Facility: HOSPITAL | Age: 75
Discharge: HOME | End: 2025-03-20
Payer: COMMERCIAL

## 2025-03-20 ENCOUNTER — OFFICE VISIT (OUTPATIENT)
Dept: ORTHOPEDIC SURGERY | Facility: HOSPITAL | Age: 75
End: 2025-03-20
Payer: COMMERCIAL

## 2025-03-20 VITALS — HEIGHT: 66 IN | BODY MASS INDEX: 22.98 KG/M2 | WEIGHT: 143 LBS

## 2025-03-20 DIAGNOSIS — S76.011A MUSCLE STRAIN OF RIGHT GLUTEAL REGION, INITIAL ENCOUNTER: ICD-10-CM

## 2025-03-20 DIAGNOSIS — M25.561 RIGHT KNEE PAIN, UNSPECIFIED CHRONICITY: ICD-10-CM

## 2025-03-20 DIAGNOSIS — M17.11 PRIMARY OSTEOARTHRITIS OF RIGHT KNEE: Primary | ICD-10-CM

## 2025-03-20 DIAGNOSIS — M25.551 RIGHT HIP PAIN: ICD-10-CM

## 2025-03-20 PROCEDURE — 99214 OFFICE O/P EST MOD 30 MIN: CPT | Performed by: ORTHOPAEDIC SURGERY

## 2025-03-20 PROCEDURE — 1159F MED LIST DOCD IN RCRD: CPT | Performed by: ORTHOPAEDIC SURGERY

## 2025-03-20 PROCEDURE — 3008F BODY MASS INDEX DOCD: CPT | Performed by: ORTHOPAEDIC SURGERY

## 2025-03-20 PROCEDURE — 1036F TOBACCO NON-USER: CPT | Performed by: ORTHOPAEDIC SURGERY

## 2025-03-20 PROCEDURE — 73564 X-RAY EXAM KNEE 4 OR MORE: CPT | Mod: 50

## 2025-03-20 PROCEDURE — 99204 OFFICE O/P NEW MOD 45 MIN: CPT | Performed by: ORTHOPAEDIC SURGERY

## 2025-03-20 PROCEDURE — 73502 X-RAY EXAM HIP UNI 2-3 VIEWS: CPT | Mod: RT

## 2025-03-20 ASSESSMENT — PAIN - FUNCTIONAL ASSESSMENT: PAIN_FUNCTIONAL_ASSESSMENT: NO/DENIES PAIN

## 2025-03-20 NOTE — PROGRESS NOTES
PRIMARY CARE PHYSICIAN: Kobi Butts MD  REFERRING PROVIDER: No referring provider defined for this encounter.     CONSULT ORTHOPAEDIC: Hip and Knee Evaluation        ASSESSMENT & PLAN    IMPRESSION:  1.  Right gluteus leni strain  2.  Primary arthritis, right knee    PLAN:  Discussed the patient findings above and reviewed her current x-rays with her.  She has minimal arthritis in right hip and is asymptomatic from this on examination.  Her right hip seems to hurt more from her gluteus leni and seems to reproduce with certain activities and exercises.  Given that she is not improving on her own and with her  I recommended she get a formal physical therapy evaluation for this which she is agreeable to.  Regarding the right knee she starting get some mild to moderate arthritic changes in lateral compartment that wax and wane in nature.  She is currently responding to conservative treatment but is unable to take anti-inflammatories for pain control due to history of ulcerative colitis.  If this does arise in terms of pain and gets worse may consider topical anti-inflammatory and a corticosteroid injection if necessary in the future but did discuss that arthritis is a progressive problem and this would likely worsen with time.  Patient is agreeable this plan of care.  Continue to work on lower extremity strengthening and stretching.  Otherwise we will follow-up as needed.      SUBJECTIVE  CHIEF COMPLAINT:   Chief Complaint   Patient presents with    Right Hip - Pain    Right Knee - Pain        HPI: Flaca Mayo is a 74 y.o. patient. Flaca Mayo has had progressive problems with  their right hip and knee  over the past 2 months. They do report any trauma. Hx of a fall 10+ years ago which hurt her knee. They do not report any constant or progressive numbness or tingling in their legs. Their symptoms are interfering with activities which include localized posterior sided hip  pain and localized inferolateral sided knee pain and crepitus in the knee, and weakness in the right leg. XR done today.    FUNCTIONAL STATUS: occasionally limited.  AMBULATORY STATUS:  independent  PREVIOUS TREATMENTS:  Tylenol PRN  HISTORY OF SURGERY ON AFFECTED HIP(S): No   BACK PAIN REPORTED: No       REVIEW OF SYSTEMS  Constitutional: See HPI for pain assessment, No significant weight loss, recent trauma  Cardiovascular: No chest pain, shortness of breath  Respiratory: No difficulty breathing, cough  Gastrointestinal: No nausea, vomiting, diarrhea, constipation  Musculoskeletal: Noted in HPI, positive for pain, restricted motion, stiffness  Integumentary: No rashes, easy bruising, redness   Neurological: no numbness or tingling in extremities, no gait disturbances   Psychiatric: No mood changes, memory changes, social issues  Heme/Lymph: no excessive swelling, easy bruising, excessive bleeding  ENT: No hearing changes  Eyes: No vision changes    Past Medical History:   Diagnosis Date    Cramp and spasm     Muscle cramps    H. pylori infection 10/13/2023    Mixed hyperlipidemia 08/23/2020    Combined hyperlipidemia    Nondisplaced fracture of olecranon process without intraarticular extension of left ulna, subsequent encounter for closed fracture with routine healing 11/12/2017    Closed nondisplaced fracture of olecranon process of left ulna without intra-articular extension with routine healing, subsequent encounter    Other conditions influencing health status     Right handed    Personal history of other (healed) physical injury and trauma 07/31/2014    History of sprain of ankle    Personal history of other diseases of the nervous system and sense organs     History of sleep apnea    Sleep disorder, unspecified     Sleep disturbances    Unspecified systolic (congestive) heart failure 10/23/2021    Systolic CHF        No Known Allergies     Past Surgical History:   Procedure Laterality Date    CARDIAC  CATHETERIZATION N/A 8/28/2024    Procedure: Left Heart Cath with Coronary Angiography and LV;  Surgeon: Lizette Escalera MD;  Location: [facility] Cardiac Cath Lab;  Service: Cardiovascular;  Laterality: N/A;    CATARACT EXTRACTION  06/09/2016    Cataract Extraction    COLONOSCOPY  08/02/2016    Colonoscopy (Fiberoptic)    ESOPHAGOGASTRODUODENOSCOPY  08/02/2016    Diagnostic Esophagogastroduodenoscopy    OTHER SURGICAL HISTORY  12/31/2014    Ovarian Cystectomy        Family History   Problem Relation Name Age of Onset    Basal cell carcinoma Parent          Social History     Socioeconomic History    Marital status:      Spouse name: Not on file    Number of children: Not on file    Years of education: Not on file    Highest education level: Not on file   Occupational History    Not on file   Tobacco Use    Smoking status: Never    Smokeless tobacco: Never   Vaping Use    Vaping status: Never Used   Substance and Sexual Activity    Alcohol use: Yes     Alcohol/week: 6.0 standard drinks of alcohol     Types: 6 Glasses of wine per week    Drug use: Not Currently    Sexual activity: Yes     Partners: Male     Birth control/protection: Post-menopausal   Other Topics Concern    Not on file   Social History Narrative    Not on file     Social Drivers of Health     Financial Resource Strain: Not on file   Food Insecurity: Not on file   Transportation Needs: Not on file   Physical Activity: Not on file   Stress: Not on file   Social Connections: Not on file   Intimate Partner Violence: Not on file   Housing Stability: Not on file        CURRENT MEDICATIONS:   Current Outpatient Medications   Medication Sig Dispense Refill    aspirin 81 mg EC tablet Take 1 tablet (81 mg) by mouth once daily. 90 tablet 3    atorvastatin (Lipitor) 80 mg tablet Take 1 tablet (80 mg) by mouth once daily at bedtime. 90 tablet 3    BACILLUS COAGULANS-INULIN ORAL Take by mouth.      cholecalciferol (Vitamin D-3) 50 mcg (2,000 unit) capsule Vitamin D3  "50 MCG (2000 UT) Oral Capsule   Refills: 0       Active      cycloSPORINE (Restasis) 0.05 % ophthalmic emulsion Administer 1 drop into affected eye(s) twice a day.      dicyclomine (Bentyl) 10 mg capsule TAKE 1 CAPSULE 3 TIMES DAILY AS NEEDED FOR ABD SPASM      docosahexaenoic acid/epa (FISH OIL ORAL) Take 1 capsule by mouth once daily.      estradiol (Estrace) 0.01 % (0.1 mg/gram) vaginal cream Insert 0.25 Applicatorfuls (1 g) into the vagina every other day. 45 g 3    estradiol (Estrace) 0.01 % (0.1 mg/gram) vaginal cream Insert a pea-sized amount into vagina three times per week at bedtime 42.5 g 3    ketoconazole (NIZOral) 2 % cream Apply topically 2 times a day. Angles of mouth x2-4 weeks for flares 30 g 11    Lialda 1.2 gram EC tablet TAKE 4 TABLETS (4.8 G) BY MOUTH ONCE DAILY. 360 tablet 1    losartan (Cozaar) 25 mg tablet Take 1 tablet (25 mg) by mouth once daily. 90 tablet 3    metoprolol tartrate (Lopressor) 25 mg tablet Take 0.5 tablets (12.5 mg) by mouth 2 times a day. 90 tablet 3    nitroglycerin (Nitrostat) 0.4 mg SL tablet Place 1 tablet (0.4 mg) under the tongue every 5 minutes if needed for chest pain. May repeat dose every 5 minutes for up to 3 doses total. 100 tablet 11    SACCHAROMYCES BOULARDII ORAL Take by mouth.      traZODone (Desyrel) 50 mg tablet Take 1 tablet (50 mg) by mouth as needed at bedtime for sleep. 30 tablet 0    vedolizumab (Entyvio) 300 mg injection Infuse 300 mg into a venous catheter.  every 6 weeks.       No current facility-administered medications for this visit.        OBJECTIVE    PHYSICAL EXAM      8/28/2024     1:00 PM 8/28/2024     1:30 PM 8/28/2024     2:00 PM 8/28/2024     2:30 PM 9/26/2024    10:09 AM 3/19/2025    11:30 AM 3/20/2025     3:27 PM   Vitals   Systolic 126 119 128 123 130 123    Diastolic 74 67 70 55 80 73    Heart Rate 61 60 61 55 63     Resp 12 12 12 14      Height     1.676 m (5' 6\") 1.664 m (5' 5.5\") 1.676 m (5' 6\")   Weight (lb)     148.2 146 143 "   BMI     23.92 kg/m2 23.93 kg/m2 23.08 kg/m2   BSA (m2)     1.77 m2 1.75 m2 1.74 m2   Visit Report     Report Report Report      Body mass index is 23.08 kg/m².    GENERAL: A/Ox3, NAD. Appears healthy, well nourished  PSYCHIATRIC: Mood stable, appropriate memory recall  EYES: EOM intact, no scleral icterus  CARDIAC: regular rate  LUNGS: Breathing non-labored  SKIN: no erythema, rashes, or ecchymoses     MUSCULOSKELETAL:  Laterality: right Knee and hip exam  - Alignment: full correctible valgus deformity  - ROM: 0 to 120 degrees  - Effusion: none  - Strength: knee extension and flexion 5/5, EHL/PF/DF motor intact  - Palpation: TTP along lateral joint line  - Stability: Anterior/Posterior stable, varus/valgus stable  - Gait: normal  - Hip Exam: flexion to 100+ degrees, full extension, internal/external rotation adequate, and no pain with log roll.  Nontender along hip.  No pain with resisted motion along the hip as well.  Negative Agusto.  - Special Tests: none performed      NEUROVASCULAR:  - Neurovascular Status: sensation intact to light touch distally  - Capillary refill brisk at extremities, Bilateral dorsalis pedis pulse 2+           IMAGING:  Multiple views of the affected right hip and knee demonstrate: Mild age-appropriate arthritis in right hip with some joint space narrowing and subchondral sclerosis, right knee with mild to mod arthritic changes isolated at the lateral compartment with joint space narrowing and subchondral sclerosis with slightly dysplastic lateral femoral condyle.   X-rays were personally reviewed and interpreted by me.  Radiology reports were reviewed by me as well, if readily available at the time.        Florida Osborne DO  Attending Surgeon  Joint Replacement and Adult Reconstructive Surgery  [place], [place]

## 2025-04-14 DIAGNOSIS — F51.01 PRIMARY INSOMNIA: ICD-10-CM

## 2025-04-15 RX ORDER — TRAZODONE HYDROCHLORIDE 50 MG/1
50 TABLET ORAL NIGHTLY PRN
Qty: 90 TABLET | Refills: 0 | Status: SHIPPED | OUTPATIENT
Start: 2025-04-15 | End: 2026-04-15

## 2025-05-06 ENCOUNTER — OFFICE VISIT (OUTPATIENT)
Dept: URGENT CARE | Age: 75
End: 2025-05-06
Payer: COMMERCIAL

## 2025-05-06 VITALS
HEART RATE: 60 BPM | RESPIRATION RATE: 17 BRPM | OXYGEN SATURATION: 96 % | SYSTOLIC BLOOD PRESSURE: 161 MMHG | DIASTOLIC BLOOD PRESSURE: 88 MMHG | TEMPERATURE: 97.7 F | HEIGHT: 66 IN | BODY MASS INDEX: 22.98 KG/M2 | WEIGHT: 143 LBS

## 2025-05-06 DIAGNOSIS — H61.22 HEARING LOSS OF LEFT EAR DUE TO CERUMEN IMPACTION: Primary | ICD-10-CM

## 2025-05-06 DIAGNOSIS — H91.92 HEARING DECREASED, LEFT: ICD-10-CM

## 2025-05-06 PROCEDURE — 3077F SYST BP >= 140 MM HG: CPT | Performed by: EMERGENCY MEDICINE

## 2025-05-06 PROCEDURE — 1126F AMNT PAIN NOTED NONE PRSNT: CPT | Performed by: EMERGENCY MEDICINE

## 2025-05-06 PROCEDURE — 3008F BODY MASS INDEX DOCD: CPT | Performed by: EMERGENCY MEDICINE

## 2025-05-06 PROCEDURE — 1159F MED LIST DOCD IN RCRD: CPT | Performed by: EMERGENCY MEDICINE

## 2025-05-06 PROCEDURE — 3079F DIAST BP 80-89 MM HG: CPT | Performed by: EMERGENCY MEDICINE

## 2025-05-06 PROCEDURE — 1160F RVW MEDS BY RX/DR IN RCRD: CPT | Performed by: EMERGENCY MEDICINE

## 2025-05-06 PROCEDURE — 1036F TOBACCO NON-USER: CPT | Performed by: EMERGENCY MEDICINE

## 2025-05-06 PROCEDURE — 99213 OFFICE O/P EST LOW 20 MIN: CPT | Performed by: EMERGENCY MEDICINE

## 2025-05-06 ASSESSMENT — PAIN SCALES - GENERAL: PAINLEVEL_OUTOF10: 0-NO PAIN

## 2025-05-06 NOTE — PROGRESS NOTES
"Subjective   Patient ID: Flaca Mayo \"Anni\" is a 74 y.o. female. They present today with a chief complaint of Ear Fullness (Both ears, stated she has a muffled kind of sense in left ear no pain , no flu like symptoms ).    History of Present Illness    Ear Fullness    This is a very pleasant 74-year-old female presents today complaining of decrease hearing in the left ear for the past couple of weeks.  She denies any ear pain.  Patient states her  is a physician and on further examination he noted impacted cerumen in her left ear canal.  She denies any headache dizziness or lightheadedness.  Past Medical History  Allergies as of 05/06/2025    (No Known Allergies)       Prescriptions Prior to Admission[1]     Medical History[2]    Surgical History[3]     reports that she has never smoked. She has never used smokeless tobacco. She reports current alcohol use of about 6.0 standard drinks of alcohol per week. She reports that she does not currently use drugs.    Review of Systems  Review of Systems   HENT:  Positive for hearing loss.    All other systems reviewed and are negative.                                 Objective    Vitals:    05/06/25 1242 05/06/25 1243   BP: 162/90 161/88   BP Location: Right arm Left arm   Patient Position: Sitting Sitting   Pulse: 60    Resp: 17    Temp: 36.5 °C (97.7 °F)    TempSrc: Oral    SpO2: 96%    Weight: 64.9 kg (143 lb)    Height: 1.676 m (5' 6\")      No LMP recorded. Patient is postmenopausal.    Physical Exam  Patient is awake alert oriented x 3 in no acute distress vital signs are stable.  Right EAC is patent TM is intact.  Left EAC is impacted with cerumen.  TM not visualized.  Ear Cerumen Removal    Date/Time: 5/6/2025 1:04 PM    Performed by: Dangelo Dyer DO  Authorized by: Dangelo Dyer DO    Consent:     Consent obtained:  Verbal    Consent given by:  Patient    Risks, benefits, and alternatives were discussed: yes      Risks discussed:  " Pain, incomplete removal and TM perforation  Universal protocol:     Patient identity confirmed:  Verbally with patient  Procedure details:     Location:  L ear    Procedure type: irrigation      Procedure outcomes: cerumen removed    Post-procedure details:     Inspection:  Ear canal clear, no bleeding and TM intact    Hearing quality:  Normal    Procedure completion:  Tolerated      Point of Care Test & Imaging Results from this visit  No results found for this visit on 05/06/25.   Imaging  No results found.    Cardiology, Vascular, and Other Imaging  No other imaging results found for the past 2 days      Diagnostic study results (if any) were reviewed by Dangelo Dyer DO.    Assessment/Plan   Allergies, medications, history, and pertinent labs/EKGs/Imaging reviewed by Dangelo Dyer DO.     Medical Decision Making      Orders and Diagnoses  Diagnoses and all orders for this visit:  Hearing loss of left ear due to cerumen impaction  Hearing decreased, left  -     Ear Cerumen Removal; Future  Other orders  -     Ear Cerumen Removal      Medical Admin Record      Patient disposition: Home    Electronically signed by Dangelo Dyer DO  1:05 PM           [1] (Not in a hospital admission)   [2]   Past Medical History:  Diagnosis Date    Cramp and spasm     Muscle cramps    H. pylori infection 10/13/2023    Mixed hyperlipidemia 08/23/2020    Combined hyperlipidemia    Nondisplaced fracture of olecranon process without intraarticular extension of left ulna, subsequent encounter for closed fracture with routine healing 11/12/2017    Closed nondisplaced fracture of olecranon process of left ulna without intra-articular extension with routine healing, subsequent encounter    Other conditions influencing health status     Right handed    Personal history of other (healed) physical injury and trauma 07/31/2014    History of sprain of ankle    Personal history of other diseases of the nervous system and  sense organs     History of sleep apnea    Sleep disorder, unspecified     Sleep disturbances    Unspecified systolic (congestive) heart failure 10/23/2021    Systolic CHF   [3]   Past Surgical History:  Procedure Laterality Date    CARDIAC CATHETERIZATION N/A 8/28/2024    Procedure: Left Heart Cath with Coronary Angiography and LV;  Surgeon: Lizette Escalera MD;  Location: [facility] Cardiac Cath Lab;  Service: Cardiovascular;  Laterality: N/A;    CATARACT EXTRACTION  06/09/2016    Cataract Extraction    COLONOSCOPY  08/02/2016    Colonoscopy (Fiberoptic)    ESOPHAGOGASTRODUODENOSCOPY  08/02/2016    Diagnostic Esophagogastroduodenoscopy    OTHER SURGICAL HISTORY  12/31/2014    Ovarian Cystectomy

## 2025-05-14 ENCOUNTER — APPOINTMENT (OUTPATIENT)
Dept: DERMATOLOGY | Facility: CLINIC | Age: 75
End: 2025-05-14
Payer: COMMERCIAL

## 2025-05-14 DIAGNOSIS — D22.9 MULTIPLE BENIGN NEVI: ICD-10-CM

## 2025-05-14 DIAGNOSIS — Z12.83 SCREENING EXAM FOR SKIN CANCER: ICD-10-CM

## 2025-05-14 DIAGNOSIS — L98.8 RHYTIDES: ICD-10-CM

## 2025-05-14 DIAGNOSIS — L82.1 SEBORRHEIC KERATOSIS: ICD-10-CM

## 2025-05-14 DIAGNOSIS — L57.0 ACTINIC KERATOSIS: Primary | ICD-10-CM

## 2025-05-14 DIAGNOSIS — L81.4 LENTIGO: ICD-10-CM

## 2025-05-14 DIAGNOSIS — B37.83 ANGULAR CHEILITIS WITH CANDIDIASIS: ICD-10-CM

## 2025-05-14 PROCEDURE — 99213 OFFICE O/P EST LOW 20 MIN: CPT | Performed by: DERMATOLOGY

## 2025-05-14 PROCEDURE — 1160F RVW MEDS BY RX/DR IN RCRD: CPT | Performed by: DERMATOLOGY

## 2025-05-14 PROCEDURE — 1036F TOBACCO NON-USER: CPT | Performed by: DERMATOLOGY

## 2025-05-14 PROCEDURE — 17000 DESTRUCT PREMALG LESION: CPT | Performed by: DERMATOLOGY

## 2025-05-14 PROCEDURE — 1159F MED LIST DOCD IN RCRD: CPT | Performed by: DERMATOLOGY

## 2025-05-14 PROCEDURE — 17003 DESTRUCT PREMALG LES 2-14: CPT | Performed by: DERMATOLOGY

## 2025-05-14 ASSESSMENT — DERMATOLOGY PATIENT ASSESSMENT
HAVE YOU HAD OR DO YOU HAVE VASCULAR DISEASE: NO
DO YOU USE SUNSCREEN: OCCASIONALLY
ARE YOU ON BIRTH CONTROL: NO
DO YOU HAVE ANY NEW OR CHANGING LESIONS: NO
ARE YOU TRYING TO GET PREGNANT: NO
HAVE YOU HAD OR DO YOU HAVE A STAPH INFECTION: NO
DO YOU USE A TANNING BED: NO
ARE YOU AN ORGAN TRANSPLANT RECIPIENT: NO
DO YOU HAVE IRREGULAR MENSTRUAL CYCLES: NO

## 2025-05-14 ASSESSMENT — DERMATOLOGY QUALITY OF LIFE (QOL) ASSESSMENT
RATE HOW EMOTIONALLY BOTHERED YOU ARE BY YOUR SKIN PROBLEM (FOR EXAMPLE, WORRY, EMBARRASSMENT, FRUSTRATION): 0 - NEVER BOTHERED
DATE THE QUALITY-OF-LIFE ASSESSMENT WAS COMPLETED: 67339
RATE HOW BOTHERED YOU ARE BY EFFECTS OF YOUR SKIN PROBLEMS ON YOUR ACTIVITIES (EG, GOING OUT, ACCOMPLISHING WHAT YOU WANT, WORK ACTIVITIES OR YOUR RELATIONSHIPS WITH OTHERS): 0 - NEVER BOTHERED
ARE THERE EXCLUSIONS OR EXCEPTIONS FOR THE QUALITY OF LIFE ASSESSMENT: NO
RATE HOW BOTHERED YOU ARE BY SYMPTOMS OF YOUR SKIN PROBLEM (EG, ITCHING, STINGING BURNING, HURTING OR SKIN IRRITATION): 0 - NEVER BOTHERED

## 2025-05-14 ASSESSMENT — PATIENT GLOBAL ASSESSMENT (PGA): PATIENT GLOBAL ASSESSMENT: PATIENT GLOBAL ASSESSMENT:  1 - CLEAR

## 2025-05-14 ASSESSMENT — ITCH NUMERIC RATING SCALE: HOW SEVERE IS YOUR ITCHING?: 0

## 2025-05-14 NOTE — Clinical Note
- Discussed benign nature and that no treatment is necessary unless it becomes painful or increases in size. Patient opts for clinical monitoring at this time.

## 2025-05-14 NOTE — Clinical Note
Clear today - previously with light pink erythema of angle of mouth bilaterally with extension slightly down marionette line

## 2025-05-14 NOTE — Clinical Note
- interested in discussing cosmetic options  - hesitant about fillers  - discussed briefly that the recommendation will likely be fillers, she is still interested in discussing    - she may find it worthwhile since these may be contributing to her perleche / angular cheilitis

## 2025-05-14 NOTE — PROGRESS NOTES
"Payal Mayo \"Anni\" is a 74 y.o. female who presents for the following: Skin Check. Last derm visit 10/16/23 for angular cheilitis and Full Skin Exam.     Intake Questions  Do you have any new or changing Lesions?: No  Are you an organ transplant recipient?: No  Have you had or do you have a Staph Infection?: No  Have you had or do you have Vacular Disease?: No  Do you use sunscreen?: Occasionally  Do you use a tanning bed?: No  Are you trying to get pregnant?: No  Are you on birth control?: No  Do you have irregular menstrual cycles?: No    Review of Systems:  No other skin or systemic complaints other than what is documented elsewhere in the note.    The following portions of the chart were reviewed this encounter and updated as appropriate:  Tobacco  Allergies  Meds  Problems  Med Hx  Surg Hx         Skin Cancer History  Biopsy Log Book  No skin cancers from Specimen Tracking.    Additional History      Specialty Problems          Dermatology Problems    Neoplasm of uncertain behavior of skin        Objective   Well appearing patient in no apparent distress; mood and affect are within normal limits.    A full examination was performed including scalp, head, eyes, ears, nose, lips, neck, chest, axillae, abdomen, back, buttocks, bilateral upper extremities, bilateral lower extremities, hands, feet, fingers, toes, fingernails, and toenails. All findings within normal limits unless otherwise noted below.    Assessment/Plan   Skin Exam  1. ACTINIC KERATOSIS (2)  Right Forehead (2)  Erythematous scaly macule(s)  -Discussed nature of diagnosis and treatment options.   -Patient wishes to proceed with Cryotherapy today  -Possible side effects of liquid nitrogen treatment reviewed including formation of blisters, crusting, tenderness, scar, and discoloration which may be permanent.  -Patient advised to return the office for re-evaluation if the treated lesion(s) do not resolve within 4-6 " weeks. Patient verbalizes understanding.  Destr of lesion - Right Forehead (2)  Complexity: simple    Destruction method: cryotherapy    Informed consent: discussed and consent obtained    Lesion destroyed using liquid nitrogen: Yes    Outcome: patient tolerated procedure well with no complications    Post-procedure details: wound care instructions given    2. MULTIPLE BENIGN NEVI  Generalized  Scattered, uniform and benign-appearing, regular brown melanocytic papules and macules.  - Discussed benign nature and that no treatment is necessary unless it becomes painful or increases in size. Patient opts for clinical monitoring at this time.   3. LENTIGO  Generalized  Scattered tan macules in sun-exposed areas.  - Discussed benign nature and that no treatment is necessary unless it becomes painful or increases in size. Patient opts for clinical monitoring at this time.   4. SEBORRHEIC KERATOSIS  Generalized  Stuck on verrucous, tan-brown papules and plaques.    - Discussed benign nature and that no treatment is necessary unless it becomes painful or increases in size. Patient opts for clinical monitoring at this time.   5. SCREENING EXAM FOR SKIN CANCER  Generalized  Full body skin exam  -No lesions concerning for malignancy on the remainder the skin exam today   - The ugly duckling sign was discussed. Monitor for any skin lesions that are different in color, shape, or size than others on body  -Sun protection was discussed. Recommend SPF 30+, hats with brims, sun protective clothing, and avoiding sun exposure between 10 AM and 2 PM whenever possible  -Recommend regular skin exams or sooner if new or changing lesions     Related Procedures  Follow Up In Dermatology - Established Patient  6. ANGULAR CHEILITIS WITH CANDIDIASIS  Lips  Clear today - previously with light pink erythema of angle of mouth bilaterally with extension slightly down marionette line  - controlled with ketoconazole cream as needed. Needs to use at  least once per month  Related Medications  ketoconazole (NIZOral) 2 % cream  Apply topically 2 times a day. Angles of mouth x2-4 weeks for flares  7. RHYTIDES  Head - Anterior (Face)  Deep marionette lines on sides of mouth  - interested in discussing cosmetic options  - hesitant about fillers  - discussed briefly that the recommendation will likely be fillers, she is still interested in discussing    - she may find it worthwhile since these may be contributing to her perleche / angular cheilitis  Related Procedures  Follow Up In Dermatology - Established Patient    Follow up 1 year Full Skin Exam   Follow up with Dr. Headley in [place] for cosmetic consultation, understands it would be a self-pay visit

## 2025-05-20 ENCOUNTER — APPOINTMENT (OUTPATIENT)
Dept: PRIMARY CARE | Facility: CLINIC | Age: 75
End: 2025-05-20
Payer: COMMERCIAL

## 2025-06-04 ENCOUNTER — APPOINTMENT (OUTPATIENT)
Dept: PRIMARY CARE | Facility: CLINIC | Age: 75
End: 2025-06-04
Payer: COMMERCIAL

## 2025-06-04 DIAGNOSIS — F51.01 PRIMARY INSOMNIA: ICD-10-CM

## 2025-06-04 DIAGNOSIS — Z12.31 VISIT FOR SCREENING MAMMOGRAM: ICD-10-CM

## 2025-06-04 DIAGNOSIS — I10 BENIGN ESSENTIAL HYPERTENSION: ICD-10-CM

## 2025-06-04 DIAGNOSIS — Z00.00 HEALTH CARE MAINTENANCE: Primary | ICD-10-CM

## 2025-06-04 DIAGNOSIS — E78.49 ESSENTIAL FAMILIAL HYPERLIPIDEMIA: ICD-10-CM

## 2025-06-04 PROCEDURE — 99397 PER PM REEVAL EST PAT 65+ YR: CPT | Performed by: INTERNAL MEDICINE

## 2025-06-04 ASSESSMENT — PROMIS GLOBAL HEALTH SCALE
CARRYOUT_SOCIAL_ACTIVITIES: EXCELLENT
RATE_MENTAL_HEALTH: EXCELLENT
RATE_AVERAGE_PAIN: 1
RATE_PHYSICAL_HEALTH: VERY GOOD
CARRYOUT_PHYSICAL_ACTIVITIES: COMPLETELY
EMOTIONAL_PROBLEMS: RARELY
RATE_GENERAL_HEALTH: VERY GOOD
RATE_SOCIAL_SATISFACTION: VERY GOOD
RATE_AVERAGE_FATIGUE: MILD
RATE_QUALITY_OF_LIFE: EXCELLENT

## 2025-06-04 NOTE — PROGRESS NOTES
Subjective   Patient ID: Anni Mayo is a 74 y.o. female who presents for No chief complaint on file..    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    Physical Exam vital signs noted alert and oriented x 3 NCAT no JVD or bruit chest clear to auscultation CV regular rate and rhythm S1-S2 abdomen soft nontender normal active bowel sounds LS spine nontender spinous process negative straight leg raise there is no right hip tenderness right knee full range of motion extremities no clubbing cyanosis or edema normal distal pulses DTR 1+    Assessment/Plan impression right leg pain insomnia anxiety other diagnoses htn hyperlipidemia  Plan she prefers some behavioral modification for anxiety and sleep okay for behavioral access clinic and then follow-up may consider medication for anxiety she would like to have some as needed medication for insomnia with sleep hygiene okay for trazodone 50 mg 1/2 to 1 tablet p.o. nightly as needed see EMR she has an orthopedic appointment tomorrow because she has had longer standing right leg pain she has no arthritic concerns in her hands or other areas posture is good continue with good overall diet and exercise and recheck for regular physical examination and screening  and blood work for lipids and advise on medication and follow-up sooner as needed as above  [id number]    Heart disease  weight 144 pounds blood pressure excellent 2024 systolic desires to lose weight down to about 133 diet and exercise she has had some bites she would need some blood work needs a mammogram requisition she is hesitant about a bone density she has had her colonoscopy she is on the Lialda and the Entyvio review her medications and continue with her hiking advised on calcium and vitamin D supplementation

## 2025-06-09 ENCOUNTER — APPOINTMENT (OUTPATIENT)
Dept: PRIMARY CARE | Facility: CLINIC | Age: 75
End: 2025-06-09
Payer: COMMERCIAL

## 2025-07-01 ENCOUNTER — APPOINTMENT (OUTPATIENT)
Dept: DERMATOLOGY | Facility: CLINIC | Age: 75
End: 2025-07-01
Payer: COMMERCIAL

## 2025-07-01 DIAGNOSIS — L98.8 RHYTIDES: ICD-10-CM

## 2025-07-01 DIAGNOSIS — Z12.83 SCREENING EXAM FOR SKIN CANCER: ICD-10-CM

## 2025-07-01 DIAGNOSIS — Z41.9 ELECTIVE SURGERY FOR PURPOSES OTHER THAN TREATING HEALTH CONDITIONS: Primary | ICD-10-CM

## 2025-07-01 PROCEDURE — DCON1 COSMETIC CONSULT: Performed by: DERMATOLOGY

## 2025-07-01 PROCEDURE — 1159F MED LIST DOCD IN RCRD: CPT | Performed by: DERMATOLOGY

## 2025-07-01 PROCEDURE — 1036F TOBACCO NON-USER: CPT | Performed by: DERMATOLOGY

## 2025-07-01 ASSESSMENT — DERMATOLOGY QUALITY OF LIFE (QOL) ASSESSMENT
ARE THERE EXCLUSIONS OR EXCEPTIONS FOR THE QUALITY OF LIFE ASSESSMENT: NO
RATE HOW EMOTIONALLY BOTHERED YOU ARE BY YOUR SKIN PROBLEM (FOR EXAMPLE, WORRY, EMBARRASSMENT, FRUSTRATION): 0 - NEVER BOTHERED
RATE HOW BOTHERED YOU ARE BY SYMPTOMS OF YOUR SKIN PROBLEM (EG, ITCHING, STINGING BURNING, HURTING OR SKIN IRRITATION): 0 - NEVER BOTHERED
RATE HOW BOTHERED YOU ARE BY EFFECTS OF YOUR SKIN PROBLEMS ON YOUR ACTIVITIES (EG, GOING OUT, ACCOMPLISHING WHAT YOU WANT, WORK ACTIVITIES OR YOUR RELATIONSHIPS WITH OTHERS): 0 - NEVER BOTHERED

## 2025-07-01 ASSESSMENT — DERMATOLOGY PATIENT ASSESSMENT
FOR PATIENTS COMING IN FOR A FOLLOW-UP VISIT - HAVE THERE BEEN ANY CHANGES IN YOUR HEALTH SINCE YOUR LAST VISIT: ALL IN MYCHART
ARE YOU TRYING TO GET PREGNANT: NO
DO YOU HAVE ANY NEW OR CHANGING LESIONS: NO
DO YOU USE A TANNING BED: NO
ARE YOU AN ORGAN TRANSPLANT RECIPIENT: NO
ARE YOU ON BIRTH CONTROL: NO
HAVE YOU HAD OR DO YOU HAVE A STAPH INFECTION: NO
DO YOU USE SUNSCREEN: OCCASIONALLY
DO YOU HAVE IRREGULAR MENSTRUAL CYCLES: NO

## 2025-07-01 ASSESSMENT — PATIENT GLOBAL ASSESSMENT (PGA): PATIENT GLOBAL ASSESSMENT: PATIENT GLOBAL ASSESSMENT:  1 - CLEAR

## 2025-07-01 ASSESSMENT — ITCH NUMERIC RATING SCALE: HOW SEVERE IS YOUR ITCHING?: 0

## 2025-07-01 NOTE — Clinical Note
The cosmetic-dermatologic indications and contraindications for treatment were discussed, and realistic and expected outcomes of this procedure.   Risks and benefits were discussed and questions were answered including anticipated duration of 3 months to 1 year depending on individual factors.  Expect bruising and swelling with filler.  In rare cases filler can occlude a blood vessel resulting in ulceration of the skin and in rare circumstances cause blindness.  Avoid dental work 2 weeks prior and will avoid dental work 2 weeks after filler injection.      We recommend 1-2 syringes of juvederm ultra plus for the marionette lines and angular creases - advised this may be helpful for angular cheilitis     No contraindication to filler with history of ulcerative colitis. She does not have a history of cold sores and no history of pyoderma gangrenosum.    Advised that her lines will not completely disappear due to redundancy of lines and deepness of lines but should help soften areas.     Also discussed voluma may be of benefit as well - but is more costly and would need for bilateral cheeks 1-2 syringes.

## 2025-07-01 NOTE — PROGRESS NOTES
"Payal Mayo \"Anni\" is a 74 y.o. female who presents for the following: Cosmetic (Pt here for Cosmetic consult for options to treat marionette lines.). No previous cosmetic procedures. No history of cold sores. Has history of ulcerative colitis, in remission x 3-4 years.    Intake Questions  Do you have any new or changing Lesions?: No  Where are these new or changing lesion(s) located?: NA  For patients coming in for a Follow-up Visit:  Have there been any changes in your health since your last visit?: all in mychart  Are you an organ transplant recipient?: No  Have you had or do you have a Staph Infection?: No  Do you use sunscreen?: Occasionally  Do you use a tanning bed?: No  Are you trying to get pregnant?: No  Are you on birth control?: No  Do you have irregular menstrual cycles?: No    Review of Systems:  No other skin or systemic complaints other than what is documented elsewhere in the note.    The following portions of the chart were reviewed this encounter and updated as appropriate:          Skin Cancer History  Biopsy Log Book  No skin cancers from Specimen Tracking.    Additional History      Specialty Problems          Dermatology Problems    Neoplasm of uncertain behavior of skin        Objective   Well appearing patient in no apparent distress; mood and affect are within normal limits.    A focused skin examination was performed of the face. All findings within normal limits unless otherwise noted below.    Assessment/Plan   Skin Exam  1. ELECTIVE SURGERY FOR PURPOSES OTHER THAN TREATING HEALTH CONDITIONS  Head - Anterior (Face)  Static deep rhytids of the angular creases and marionette lines  The cosmetic-dermatologic indications and contraindications for treatment were discussed, and realistic and expected outcomes of this procedure.   Risks and benefits were discussed and questions were answered including anticipated duration of 3 months to 1 year depending on individual " factors.  Expect bruising and swelling with filler.  In rare cases filler can occlude a blood vessel resulting in ulceration of the skin and in rare circumstances cause blindness.  Avoid dental work 2 weeks prior and will avoid dental work 2 weeks after filler injection.      We recommend 1-2 syringes of juvederm ultra plus for the marionette lines and angular creases - advised this may be helpful for angular cheilitis     No contraindication to filler with history of ulcerative colitis. She does not have a history of cold sores and no history of pyoderma gangrenosum.    Advised that her lines will not completely disappear due to redundancy of lines and deepness of lines but should help soften areas.     Also discussed voluma may be of benefit as well - but is more costly and would need for bilateral cheeks 1-2 syringes.   2. RHYTIDES    N/a  Related Procedures  Follow Up In Dermatology - Established Patient  3. SCREENING EXAM FOR SKIN CANCER    N/a  Related Procedures  Follow Up In Dermatology - Established Patient

## 2025-07-01 NOTE — PROGRESS NOTES
"Subjective     Flaca Mayo \"Anni\" is a 74 y.o. female who presents for the following: Cosmetic (Pt here for Cosmetic consult for options to treat marionette lines.).     Intake Questions  Do you have any new or changing Lesions?: No  Where are these new or changing lesion(s) located?: NA  For patients coming in for a Follow-up Visit:  Have there been any changes in your health since your last visit?: all in mychart  Are you an organ transplant recipient?: No  Have you had or do you have a Staph Infection?: No  Do you use sunscreen?: Occasionally  Do you use a tanning bed?: No  Are you trying to get pregnant?: No  Are you on birth control?: No  Do you have irregular menstrual cycles?: No    Review of Systems:  No other skin or systemic complaints other than what is documented elsewhere in the note.      Skin Cancer History  Biopsy Log Book  No skin cancers from Specimen Tracking.    Additional History      Specialty Problems          Dermatology Problems    Neoplasm of uncertain behavior of skin        Objective   Well appearing patient in no apparent distress; mood and affect are within normal limits.    A focused skin examination was performed. All findings within normal limits unless otherwise noted below.    Assessment/Plan   Skin Exam  1. RHYTIDES    The cosmetic-dermatologic indications and contraindications for treatment were discussed, and realistic and expected outcomes of this procedure.   Risks and benefits were discussed and questions were answered including anticipated duration of 3 months to 1 year depending on individual factors.  Expect bruising and swelling with filler.  In rare cases filler can occlude a blood vessel resulting in ulceration of the skin and in rare circumstances cause blindness.  Avoid dental work 2 weeks prior and will avoid dental work 2 weeks after filler injection.      Related Procedures  Follow Up In Dermatology - Established Patient  2. SCREENING EXAM FOR SKIN " CANCER      Related Procedures  Follow Up In Dermatology - Established Patient

## 2025-07-09 ENCOUNTER — APPOINTMENT (OUTPATIENT)
Dept: RADIOLOGY | Facility: CLINIC | Age: 75
End: 2025-07-09
Payer: COMMERCIAL

## 2025-07-09 VITALS — BODY MASS INDEX: 22.98 KG/M2 | WEIGHT: 143 LBS | HEIGHT: 66 IN

## 2025-07-09 DIAGNOSIS — Z12.31 VISIT FOR SCREENING MAMMOGRAM: ICD-10-CM

## 2025-07-09 PROCEDURE — 77067 SCR MAMMO BI INCL CAD: CPT

## 2025-07-09 PROCEDURE — 77063 BREAST TOMOSYNTHESIS BI: CPT | Performed by: RADIOLOGY

## 2025-07-09 PROCEDURE — 77067 SCR MAMMO BI INCL CAD: CPT | Performed by: RADIOLOGY

## 2025-07-10 LAB
ALBUMIN SERPL-MCNC: 4 G/DL (ref 3.6–5.1)
ALP SERPL-CCNC: 68 U/L (ref 37–153)
ALT SERPL-CCNC: 27 U/L (ref 6–29)
ANION GAP SERPL CALCULATED.4IONS-SCNC: 5 MMOL/L (CALC) (ref 7–17)
AST SERPL-CCNC: 32 U/L (ref 10–35)
BILIRUB SERPL-MCNC: 0.5 MG/DL (ref 0.2–1.2)
BUN SERPL-MCNC: 22 MG/DL (ref 7–25)
CALCIUM SERPL-MCNC: 9.4 MG/DL (ref 8.6–10.4)
CHLORIDE SERPL-SCNC: 106 MMOL/L (ref 98–110)
CHOLEST SERPL-MCNC: 142 MG/DL
CHOLEST/HDLC SERPL: 2.2 (CALC)
CO2 SERPL-SCNC: 32 MMOL/L (ref 20–32)
CREAT SERPL-MCNC: 0.71 MG/DL (ref 0.6–1)
EGFRCR SERPLBLD CKD-EPI 2021: 90 ML/MIN/1.73M2
ERYTHROCYTE [DISTWIDTH] IN BLOOD BY AUTOMATED COUNT: 13 % (ref 11–15)
GLUCOSE SERPL-MCNC: 125 MG/DL (ref 65–139)
HCT VFR BLD AUTO: 46.3 % (ref 35–45)
HDLC SERPL-MCNC: 66 MG/DL
HGB BLD-MCNC: 14.5 G/DL (ref 11.7–15.5)
LDLC SERPL CALC-MCNC: 58 MG/DL (CALC)
MCH RBC QN AUTO: 31.9 PG (ref 27–33)
MCHC RBC AUTO-ENTMCNC: 31.3 G/DL (ref 32–36)
MCV RBC AUTO: 102 FL (ref 80–100)
NONHDLC SERPL-MCNC: 76 MG/DL (CALC)
PLATELET # BLD AUTO: 251 THOUSAND/UL (ref 140–400)
PMV BLD REES-ECKER: 10 FL (ref 7.5–12.5)
POTASSIUM SERPL-SCNC: 4 MMOL/L (ref 3.5–5.3)
PROT SERPL-MCNC: 6.5 G/DL (ref 6.1–8.1)
RBC # BLD AUTO: 4.54 MILLION/UL (ref 3.8–5.1)
SODIUM SERPL-SCNC: 143 MMOL/L (ref 135–146)
TRIGL SERPL-MCNC: 95 MG/DL
WBC # BLD AUTO: 4.8 THOUSAND/UL (ref 3.8–10.8)

## 2025-08-26 ENCOUNTER — APPOINTMENT (OUTPATIENT)
Dept: DERMATOLOGY | Facility: CLINIC | Age: 75
End: 2025-08-26
Payer: COMMERCIAL

## 2025-08-26 ENCOUNTER — PATIENT MESSAGE (OUTPATIENT)
Dept: DERMATOLOGY | Facility: CLINIC | Age: 75
End: 2025-08-26

## 2025-08-26 DIAGNOSIS — Z41.9 ELECTIVE SURGERY FOR PURPOSES OTHER THAN TREATING HEALTH CONDITIONS: Primary | ICD-10-CM

## 2025-08-26 PROCEDURE — PJUV7: Performed by: DERMATOLOGY

## 2025-08-27 ASSESSMENT — DERMATOLOGY QUALITY OF LIFE (QOL) ASSESSMENT
WHAT SINGLE SKIN CONDITION LISTED BELOW IS THE PATIENT ANSWERING THE QUALITY-OF-LIFE ASSESSMENT QUESTIONS ABOUT: NONE OF THE ABOVE
RATE HOW EMOTIONALLY BOTHERED YOU ARE BY YOUR SKIN PROBLEM (FOR EXAMPLE, WORRY, EMBARRASSMENT, FRUSTRATION): 1
RATE HOW BOTHERED YOU ARE BY EFFECTS OF YOUR SKIN PROBLEMS ON YOUR ACTIVITIES (EG, GOING OUT, ACCOMPLISHING WHAT YOU WANT, WORK ACTIVITIES OR YOUR RELATIONSHIPS WITH OTHERS): 0 - NEVER BOTHERED
RATE HOW BOTHERED YOU ARE BY SYMPTOMS OF YOUR SKIN PROBLEM (EG, ITCHING, STINGING BURNING, HURTING OR SKIN IRRITATION): 3
RATE HOW BOTHERED YOU ARE BY SYMPTOMS OF YOUR SKIN PROBLEM (EG, ITCHING, STINGING BURNING, HURTING OR SKIN IRRITATION): 3
RATE HOW EMOTIONALLY BOTHERED YOU ARE BY YOUR SKIN PROBLEM (FOR EXAMPLE, WORRY, EMBARRASSMENT, FRUSTRATION): 1
RATE HOW BOTHERED YOU ARE BY EFFECTS OF YOUR SKIN PROBLEMS ON YOUR ACTIVITIES (EG, GOING OUT, ACCOMPLISHING WHAT YOU WANT, WORK ACTIVITIES OR YOUR RELATIONSHIPS WITH OTHERS): 0 - NEVER BOTHERED
WHAT SINGLE SKIN CONDITION LISTED BELOW IS THE PATIENT ANSWERING THE QUALITY-OF-LIFE ASSESSMENT QUESTIONS ABOUT: NONE OF THE ABOVE

## 2025-08-27 ASSESSMENT — PATIENT GLOBAL ASSESSMENT (PGA): WHAT IS THE PGA: PATIENT GLOBAL ASSESSMENT:  2 - MILD

## 2025-08-28 ENCOUNTER — OFFICE VISIT (OUTPATIENT)
Dept: DERMATOLOGY | Facility: CLINIC | Age: 75
End: 2025-08-28
Payer: COMMERCIAL

## 2025-08-28 DIAGNOSIS — D48.5 NEOPLASM OF UNCERTAIN BEHAVIOR OF SKIN: Primary | ICD-10-CM

## 2025-08-28 PROCEDURE — 11102 TANGNTL BX SKIN SINGLE LES: CPT | Performed by: DERMATOLOGY

## 2025-08-28 PROCEDURE — 1036F TOBACCO NON-USER: CPT | Performed by: DERMATOLOGY

## 2025-08-28 PROCEDURE — 1160F RVW MEDS BY RX/DR IN RCRD: CPT | Performed by: DERMATOLOGY

## 2025-08-28 PROCEDURE — 1159F MED LIST DOCD IN RCRD: CPT | Performed by: DERMATOLOGY

## 2025-08-28 ASSESSMENT — DERMATOLOGY PATIENT ASSESSMENT
WHERE ARE THESE NEW OR CHANGING LESIONS LOCATED: RT THIGH
DO YOU HAVE ANY NEW OR CHANGING LESIONS: YES

## 2025-08-28 ASSESSMENT — ITCH NUMERIC RATING SCALE: HOW SEVERE IS YOUR ITCHING?: 0

## 2025-08-28 ASSESSMENT — DERMATOLOGY QUALITY OF LIFE (QOL) ASSESSMENT
RATE HOW EMOTIONALLY BOTHERED YOU ARE BY YOUR SKIN PROBLEM (FOR EXAMPLE, WORRY, EMBARRASSMENT, FRUSTRATION): 0 - NEVER BOTHERED
ARE THERE EXCLUSIONS OR EXCEPTIONS FOR THE QUALITY OF LIFE ASSESSMENT: NO
DATE THE QUALITY-OF-LIFE ASSESSMENT WAS COMPLETED: 67445
RATE HOW BOTHERED YOU ARE BY SYMPTOMS OF YOUR SKIN PROBLEM (EG, ITCHING, STINGING BURNING, HURTING OR SKIN IRRITATION): 0 - NEVER BOTHERED
RATE HOW BOTHERED YOU ARE BY EFFECTS OF YOUR SKIN PROBLEMS ON YOUR ACTIVITIES (EG, GOING OUT, ACCOMPLISHING WHAT YOU WANT, WORK ACTIVITIES OR YOUR RELATIONSHIPS WITH OTHERS): 0 - NEVER BOTHERED

## 2025-08-28 ASSESSMENT — PATIENT GLOBAL ASSESSMENT (PGA): PATIENT GLOBAL ASSESSMENT: PATIENT GLOBAL ASSESSMENT:  1 - CLEAR

## 2025-09-02 LAB
LABORATORY COMMENT REPORT: NORMAL
PATH REPORT.FINAL DX SPEC: NORMAL
PATH REPORT.GROSS SPEC: NORMAL
PATH REPORT.MICROSCOPIC SPEC OTHER STN: NORMAL
PATH REPORT.RELEVANT HX SPEC: NORMAL
PATH REPORT.TOTAL CANCER: NORMAL

## 2025-11-13 ENCOUNTER — APPOINTMENT (OUTPATIENT)
Dept: GASTROENTEROLOGY | Facility: CLINIC | Age: 75
End: 2025-11-13
Payer: COMMERCIAL

## 2026-05-19 ENCOUNTER — APPOINTMENT (OUTPATIENT)
Dept: DERMATOLOGY | Facility: CLINIC | Age: 76
End: 2026-05-19
Payer: COMMERCIAL

## (undated) DEVICE — CATHETER, ANGIO, IMPULSE, PIG 155 DEG, 5 FR X 110 CM

## (undated) DEVICE — CATHETER, ANGIO, IMPULSE, FL3.5, 5 FR X 100 CM

## (undated) DEVICE — CATHETER, ANGIO, IMPULSE, FL4, 5 FR X 100 CM

## (undated) DEVICE — GUIDEWIRE, HI-TORQUE, VERSACORE, 260CM, FLOPPY

## (undated) DEVICE — CATHETER, ANGIO, IMPULSE, FR4, 5 FR X 100 CM

## (undated) DEVICE — SHEATH, GLIDESHEATH, SLENDER, 6FR 10CM

## (undated) DEVICE — TR BAND, RADIAL COMPRESSION, STANDARD, 24CM